# Patient Record
Sex: MALE | Race: WHITE | Employment: OTHER | URBAN - METROPOLITAN AREA
[De-identification: names, ages, dates, MRNs, and addresses within clinical notes are randomized per-mention and may not be internally consistent; named-entity substitution may affect disease eponyms.]

---

## 2017-02-17 ENCOUNTER — DOCUMENTATION ONLY (OUTPATIENT)
Dept: VASCULAR SURGERY | Facility: CLINIC | Age: 67
End: 2017-02-17

## 2017-03-21 ENCOUNTER — TRANSFERRED RECORDS (OUTPATIENT)
Dept: HEALTH INFORMATION MANAGEMENT | Facility: CLINIC | Age: 67
End: 2017-03-21

## 2017-05-15 ENCOUNTER — OFFICE VISIT (OUTPATIENT)
Dept: FAMILY MEDICINE | Facility: CLINIC | Age: 67
End: 2017-05-15

## 2017-05-15 VITALS
SYSTOLIC BLOOD PRESSURE: 92 MMHG | HEART RATE: 62 BPM | OXYGEN SATURATION: 100 % | BODY MASS INDEX: 22.25 KG/M2 | DIASTOLIC BLOOD PRESSURE: 62 MMHG | TEMPERATURE: 97.7 F | WEIGHT: 150.75 LBS

## 2017-05-15 DIAGNOSIS — I50.32 CHRONIC DIASTOLIC CONGESTIVE HEART FAILURE (H): Primary | ICD-10-CM

## 2017-05-15 DIAGNOSIS — R52 INTRACTABLE PAIN: ICD-10-CM

## 2017-05-15 DIAGNOSIS — F10.10 EXCESSIVE DRINKING OF ALCOHOL: ICD-10-CM

## 2017-05-15 DIAGNOSIS — L40.9 PSORIASIS: ICD-10-CM

## 2017-05-15 RX ORDER — CLOBETASOL PROPIONATE 0.05 G/100ML
SHAMPOO TOPICAL
COMMUNITY

## 2017-05-15 RX ORDER — CLOBETASOL PROPIONATE 0.5 MG/G
CREAM TOPICAL 2 TIMES DAILY
COMMUNITY

## 2017-05-15 NOTE — MR AVS SNAPSHOT
After Visit Summary   5/15/2017    Montana Salamanca    MRN: [id number]           Patient Information     Date Of Birth          1950        Visit Information        Provider Department      5/15/2017 10:00 AM Tomas Pena MD [facility]         Follow-ups after your visit        Who to contact     Please call your clinic at [phone] to:    Ask questions about your health    Make or cancel appointments    Discuss your medicines    Learn about your test results    Speak to your doctor   If you have compliments or concerns about an experience at your clinic, or if you wish to file a complaint, please contact [facility] Patient Relations at [phone] or email us at [email or web address]         Additional Information About Your Visit        MyChart Information     Smart [employer] gives you secure access to your electronic health record. If you see a primary care provider, you can also send messages to your care team and make appointments. If you have questions, please call your primary care clinic.  If you do not have a primary care provider, please call [phone] and they will assist you.      B-Obvious is an electronic gateway that provides easy, online access to your medical records. With B-Obvious, you can request a clinic appointment, read your test results, renew a prescription or communicate with your care team.     To access your existing account, please contact your [facility] or call [phone] for assistance.        Care EveryWhere ID     This is your Care EveryWhere ID. This could be used by other organizations to access your [facility] medical records  [id number]        Your Vitals Were     Pulse Temperature Pulse Oximetry BMI (Body Mass Index)          62 97.7  F (36.5  C) (Oral) 100% 22.25 kg/m2         Blood Pressure from Last 3 Encounters:   05/15/17 92/62   08/31/16 134/83   06/29/16 128/69    Weight from  Last 3 Encounters:   05/15/17 150 lb 12 oz (68.4 kg)   08/31/16 148 lb (67.1 kg)   06/29/16 145 lb 14.4 oz (66.2 kg)              Today, you had the following     No orders found for display         Today's Medication Changes          These changes are accurate as of: 5/15/17 11:16 AM.  If you have any questions, ask your nurse or doctor.               These medicines have changed or have updated prescriptions.        Dose/Directions    polyethylene glycol 3350 Powd   This may have changed:    - when to take this  - reasons to take this  - additional instructions   Used for:  Constipation, unspecified constipation type        Take 17 gm daily by mouth, dissolved in 8 oz fluid.   Quantity:  3 Bottle   Refills:  1                Primary Care Provider Office Phone # Fax #    Tomas Pena [phone]       [address] [address]        Thank you!     Thank you for choosing [facility]  for your care. Our goal is always to provide you with excellent care. Hearing back from our patients is one way we can continue to improve our services. Please take a few minutes to complete the written survey that you may receive in the mail after your visit with us. Thank you!             Your Updated Medication List - Protect others around you: Learn how to safely use, store and throw away your medicines at www.disposemymeds.org.          This list is accurate as of: 5/15/17 11:16 AM.  Always use your most recent med list.                   Brand Name Dispense Instructions for use    acyclovir 800 MG tablet    ZOVIRAX    50 tablet    Take 1 tablet by mouth 5 times daily. (every 4 hours) For the next 7 - 10 days.       aspirin 81 MG tablet      Take  by mouth daily.       BENADRYL 25 MG tablet   Generic drug:  diphenhydrAMINE      Take 25 mg by mouth every 6 hours as needed. When stung or have allergic reaction to food, use with Epipen.       bisacodyl 5 MG EC tablet     DULCOLAX     Take 1 tablet by mouth daily as needed       carvedilol 6.25 MG tablet    COREG    180 tablet    Take 1 tablet (6.25 mg) by mouth 2 times daily (with meals)       chlorpheniramine-pseudoePHEDrine 8-120 MG per capsule    DECONAMINE SR     Take 1 capsule by mouth 2 times daily as needed.       * clobetasol 0.05 % cream    TEMOVATE     Apply topically 2 times daily       * clobetasol propionate 0.05 % Sham          clotrimazole-betamethasone lotion    LOTRISONE     Apply  topically as needed.       desonide 0.05 % cream    DESOWEN     Apply  topically as needed.       diazepam 2 MG tablet    VALIUM    30 tablet    Take 1 tablet (2 mg) by mouth nightly as needed for anxiety or sleep       DULoxetine 60 MG EC capsule    CYMBALTA    90 capsule    Take 1 capsule (60 mg) by mouth daily       EPINEPHrine 0.3 MG/0.3ML injection    EPIPEN 2-HUGO    2 each    Inject 0.3 mLs (0.3 mg) into the muscle once as needed for anaphylaxis       ketoconazole 2 % cream    NIZORAL     Apply  topically daily as needed.       losartan 50 MG tablet    COZAAR    180 tablet    Take 1 tablet (50 mg) by mouth 2 times daily       magnesium 250 MG tablet      Take 1 tablet by mouth as needed       OTEZLA 30 MG tablet   Generic drug:  apremilast      Take 30 mg by mouth daily Reported on 5/15/2017       pantoprazole 40 MG EC tablet    PROTONIX    180 tablet    Take 1 tab po twice daily       polyethylene glycol 3350 Powd     3 Bottle    Take 17 gm daily by mouth, dissolved in 8 oz fluid.       SIMETHICONE      Take 1-2 a week.       simvastatin 20 MG tablet    ZOCOR    90 tablet    Take 1 tablet (20 mg) by mouth At Bedtime       spironolactone 50 MG tablet    ALDACTONE    90 tablet    Take 1 tablet (50 mg) by mouth daily       VITAMIN B COMPLEX PO      Take 1 tablet by mouth daily.       vitamin D 1000 UNITS capsule      Take 1 capsule by mouth daily.       * Notice:  This list has 2 medication(s) that are the same as other medications  prescribed for you. Read the directions carefully, and ask your doctor or other care provider to review them with you.

## 2017-05-15 NOTE — NURSING NOTE
67 year old  Chief Complaint   Patient presents with     RECHECK     labs       Blood pressure 92/62, pulse 62, temperature 97.7  F (36.5  C), temperature source Oral, weight 150 lb 12 oz (68.4 kg), SpO2 100 %. Body mass index is 22.25 kg/(m^2).  Patient Active Problem List   Diagnosis     Hypotestosteronism     Vitamin D deficiency     Hyperlipidemia LDL goal <130     Osteopenia     Preventative health care     Anxiety     Cervical pain (neck)     PTSD (post-traumatic stress disorder)     CHF (congestive heart failure) (H)     Bleeding external hemorrhoids     Excessive drinking of alcohol     Chronic pain of left knee       Wt Readings from Last 2 Encounters:   05/15/17 150 lb 12 oz (68.4 kg)   08/31/16 148 lb (67.1 kg)     BP Readings from Last 3 Encounters:   05/15/17 92/62   08/31/16 134/83   06/29/16 128/69         Current Outpatient Prescriptions   Medication     clobetasol (TEMOVATE) 0.05 % cream     clobetasol propionate 0.05 % SHAM     simvastatin (ZOCOR) 20 MG tablet     spironolactone (ALDACTONE) 50 MG tablet     carvedilol (COREG) 6.25 MG tablet     DULoxetine (CYMBALTA) 60 MG capsule     pantoprazole (PROTONIX) 40 MG enteric coated tablet     losartan (COZAAR) 50 MG tablet     apremilast (OTEZLA) 30 MG tablet     polyethylene glycol 3350 POWD     diazepam (VALIUM) 2 MG tablet     EPINEPHrine (EPIPEN 2-HUGO) 0.3 MG/0.3ML injection     aspirin 81 MG tablet     magnesium 250 MG tablet     SIMETHICONE     B Complex Vitamins (VITAMIN B COMPLEX PO)     Cholecalciferol (VITAMIN D) 1000 UNITS capsule     diphenhydrAMINE (BENADRYL) 25 MG tablet     bisacodyl (DULCOLAX) 5 MG EC tablet     chlorpheniramine-pseudoePHEDrine (DECONAMINE SR) 8-120 MG per capsule     clotrimazole-betamethasone (LOTRISONE) lotion     desonide (DESOWEN) 0.05 % cream     ketoconazole (NIZORAL) 2 % cream     acyclovir (ZOVIRAX) 800 MG tablet     No current facility-administered medications for this visit.        Social History   Substance  Use Topics     Smoking status: Never Smoker     Smokeless tobacco: Never Used     Alcohol use No       Health Maintenance Due   Topic Date Due     ADVANCE DIRECTIVE PLANNING Q5 YRS (NO INBASKET)  03/31/1969     FALL RISK ASSESSMENT  03/31/2016     DEXA Q3 YR  07/12/2016     PNEUMOCOCCAL (2 of 2 - PPSV23) 08/04/2016       No results found for: PAP      May 15, 2017 10:04 AM

## 2017-05-16 PROBLEM — L40.9 PSORIASIS: Status: ACTIVE | Noted: 2017-05-16

## 2017-05-16 PROBLEM — R52 INTRACTABLE PAIN: Status: ACTIVE | Noted: 2017-05-16

## 2017-05-16 NOTE — PROGRESS NOTES
"CHIEF COMPLAINT:  Followup regarding a number of chronic conditions.      HISTORY OF PRESENT ILLNESS:  Montana is a 67-year-old who spends much of his year living in [place].  He is back for a few weeks.      He has a history of CHF and for that is on a combination of spironolactone 50 mg daily, carvedilol 6.25 mg daily and losartan 50 mg twice daily.  He has basically stopped drinking all alcohol, and as a result, his blood pressure has gone from 134/83 last summer down to 92/62.  In my opinion, he is somewhat overmedicated at this point.  We discussed the 3 different categories of antihypertensive medication that he is on and the fact that he could certainly decrease the losartan from 50 mg b.i.d. to once daily.  He is going to go ahead and do that.      As mentioned, he is really diminished his alcohol intake.  However, he is now smoking a cigar up to 3 times daily.  He was hoping that I could endorse this.  Of course, I cannot.  I am not really sure this is the lesser of 2 evils, but his blood pressure is certainly better as a result.  He has not lost any weight.  In addition, he has intractable pain in his \"whole spine.\"  He has found that using medicinal marijuana in [place] has been extremely beneficial.  It allows him to sleep and get a good night of sleep.  Therefore, while in [place], he would like to be registered for the [place] Program.  The nature of this was discussed.  He understands he needs to sign up, indicate on a pain scale where he is at and that the medication is not covered by insurance.  In addition, it is not smokable.  He understands all of this.      CURRENT MEDICATIONS:  Reviewed.      OBJECTIVE:  Montana is in no distress.  BP 92/62 with a heart rate of 62 and regular.  Temperature is 97.7.  He weighs 150 pounds.  BMI 22.25.  O2 sats 100% on room air.  He brought some labs from [place].  His lipid panel looks really quite good.  Total 174 with an HDL of 79 and a ratio of just 2.2.  LDL " "cholesterol all the way down to 42.  Glucose 104.  Creatinine 1.29, 4/100 of a point elevated.  BUN elevated by 1 point to 26.  GFR diminished by 2 points at 58.  He has always been slightly anemic and his hemoglobin is 11.1.  All of these labs were obtained on March 21 in [place].  His PHQ-9 came back with a total score of 11.  Symptoms make it \"somewhat difficult\" to get through the day.  He is on duloxetine 60 mg daily and would like to continue taking that.  He has a history of PTSD.  Physical exam not performed today.  Labs reviewed from [place].  He also reports his cardiologist there told him that his most recent ejection fraction was somewhere between 45% and 50%, and in the hospital, it had been 50% to 55% but they thought that those were within the margin of error.      ASSESSMENT AND PLAN:   1.  History of congestive heart failure with elevated blood pressure.  On 3 antihypertensive/cardiovascular medications.  We are going to decrease his losartan from 50 mg twice daily to once daily.   2.  Intractable pain.  This is in his spine from neck to the base.  I will involve him in the [place] [facility] Cannabis Program.  It is up to him to follow through on that.  His PEG score was 9/10.   3.  History of excessive drinking of alcohol, now markedly curtailed.  He switched to smoking cigars.   4.  Not mentioned above but he has very severe psoriasis.  He has been on 1 biologic agent which has been stopped.  Humira was considered, but he is not going to do that.  He is open to seeing one of our dermatologists here.  I will place the referral for that.   5.  PTSD, stable.  Call with any problems or questions.      Total time spent with Mr. Salamanca, over 25 minutes, more than 50% of that time was spent in care, coordination and counseling.       "

## 2017-05-17 ASSESSMENT — ANXIETY QUESTIONNAIRES
1. FEELING NERVOUS, ANXIOUS, OR ON EDGE: NEARLY EVERY DAY
6. BECOMING EASILY ANNOYED OR IRRITABLE: SEVERAL DAYS
2. NOT BEING ABLE TO STOP OR CONTROL WORRYING: MORE THAN HALF THE DAYS
IF YOU CHECKED OFF ANY PROBLEMS ON THIS QUESTIONNAIRE, HOW DIFFICULT HAVE THESE PROBLEMS MADE IT FOR YOU TO DO YOUR WORK, TAKE CARE OF THINGS AT HOME, OR GET ALONG WITH OTHER PEOPLE: SOMEWHAT DIFFICULT
7. FEELING AFRAID AS IF SOMETHING AWFUL MIGHT HAPPEN: MORE THAN HALF THE DAYS
5. BEING SO RESTLESS THAT IT IS HARD TO SIT STILL: NOT AT ALL
GAD7 TOTAL SCORE: 11
3. WORRYING TOO MUCH ABOUT DIFFERENT THINGS: MORE THAN HALF THE DAYS

## 2017-05-17 ASSESSMENT — PATIENT HEALTH QUESTIONNAIRE - PHQ9: 5. POOR APPETITE OR OVEREATING: SEVERAL DAYS

## 2017-05-18 ASSESSMENT — ANXIETY QUESTIONNAIRES: GAD7 TOTAL SCORE: 11

## 2017-05-18 ASSESSMENT — PATIENT HEALTH QUESTIONNAIRE - PHQ9: SUM OF ALL RESPONSES TO PHQ QUESTIONS 1-9: 12

## 2017-06-21 DIAGNOSIS — F33.1 MAJOR DEPRESSIVE DISORDER, RECURRENT EPISODE, MODERATE (H): ICD-10-CM

## 2017-06-21 DIAGNOSIS — E78.5 HYPERLIPIDEMIA LDL GOAL <130: ICD-10-CM

## 2017-06-21 RX ORDER — SIMVASTATIN 20 MG
20 TABLET ORAL AT BEDTIME
Qty: 90 TABLET | Refills: 0 | Status: SHIPPED | OUTPATIENT
Start: 2017-06-21 | End: 2017-12-22

## 2017-06-21 RX ORDER — DULOXETIN HYDROCHLORIDE 60 MG/1
60 CAPSULE, DELAYED RELEASE ORAL DAILY
Qty: 90 CAPSULE | Refills: 1 | Status: SHIPPED | OUTPATIENT
Start: 2017-06-21 | End: 2018-05-15

## 2017-06-21 NOTE — TELEPHONE ENCOUNTER
Routing refill request to provider for review/approval because: Zocor  Drug interaction warning- Ketoconazole and Zocor      Refilled Cymbalta for 6 months.       Cymbalta     Last Office Visit with [facility] primary care provider:  5/2017        Last PHQ-9 score on record=   PHQ-9 SCORE 5/17/2017   Total Score 12         Zocor       Last Office Visit with [facility], [facility] or [facility] prescribing provider: 5/2017       Lab Results   Component Value Date    CHOL 245 08/31/2016     Lab Results   Component Value Date     08/31/2016     Lab Results   Component Value Date    LDL 52 08/31/2016     Lab Results   Component Value Date    TRIG 59 08/31/2016     Lab Results   Component Value Date    CHOLHDLRATIO 1.4 08/04/2015

## 2017-06-23 DIAGNOSIS — I10 ESSENTIAL HYPERTENSION WITH GOAL BLOOD PRESSURE LESS THAN 140/90: ICD-10-CM

## 2017-06-23 RX ORDER — CARVEDILOL 6.25 MG/1
6.25 TABLET ORAL 2 TIMES DAILY WITH MEALS
Qty: 180 TABLET | Refills: 1 | Status: SHIPPED | OUTPATIENT
Start: 2017-08-01 | End: 2018-01-30

## 2017-06-23 RX ORDER — LOSARTAN POTASSIUM 50 MG/1
50 TABLET ORAL DAILY
Qty: 180 TABLET | Refills: 3 | COMMUNITY
Start: 2017-05-15 | End: 2018-05-15

## 2017-06-23 RX ORDER — SPIRONOLACTONE 50 MG/1
50 TABLET, FILM COATED ORAL DAILY
Qty: 90 TABLET | Refills: 1 | Status: SHIPPED | OUTPATIENT
Start: 2017-08-01 | End: 2018-01-30

## 2017-06-23 NOTE — TELEPHONE ENCOUNTER
Prescription approved per [facility] Refill Protocol.  6 month supply only, future fill to mail order for when will be due.    Labs scanned in for March date on the BMP results   Changed the Losartan to historical,  only one daily now per Becky instruction at last visit.

## 2017-11-06 DIAGNOSIS — K21.9 GASTROESOPHAGEAL REFLUX DISEASE, ESOPHAGITIS PRESENCE NOT SPECIFIED: ICD-10-CM

## 2017-11-06 RX ORDER — PANTOPRAZOLE SODIUM 40 MG/1
TABLET, DELAYED RELEASE ORAL
Qty: 180 TABLET | Refills: 1 | Status: SHIPPED | OUTPATIENT
Start: 2017-11-06 | End: 2018-05-15

## 2017-12-22 DIAGNOSIS — E78.5 HYPERLIPIDEMIA LDL GOAL <130: ICD-10-CM

## 2017-12-22 RX ORDER — SIMVASTATIN 20 MG
20 TABLET ORAL AT BEDTIME
Qty: 90 TABLET | Refills: 1 | Status: SHIPPED | OUTPATIENT
Start: 2017-12-22 | End: 2018-10-12

## 2018-01-30 DIAGNOSIS — I10 ESSENTIAL HYPERTENSION WITH GOAL BLOOD PRESSURE LESS THAN 140/90: ICD-10-CM

## 2018-01-30 NOTE — TELEPHONE ENCOUNTER
Last office visit: 05/15/2017, no future appoint,    Medication is being filled for 1 time refill only due to:  Patient needs labs BMP. Future labs ordered BMP.   Routed to provider for mail order

## 2018-01-31 RX ORDER — SPIRONOLACTONE 50 MG/1
50 TABLET, FILM COATED ORAL DAILY
Qty: 90 TABLET | Refills: 1 | Status: SHIPPED | OUTPATIENT
Start: 2018-01-31 | End: 2018-12-26

## 2018-01-31 RX ORDER — CARVEDILOL 6.25 MG/1
6.25 TABLET ORAL 2 TIMES DAILY WITH MEALS
Qty: 180 TABLET | Refills: 1 | Status: SHIPPED | OUTPATIENT
Start: 2018-01-31 | End: 2018-07-16

## 2018-05-07 ENCOUNTER — OFFICE VISIT (OUTPATIENT)
Dept: FAMILY MEDICINE | Facility: CLINIC | Age: 68
End: 2018-05-07
Payer: MEDICARE

## 2018-05-07 VITALS
OXYGEN SATURATION: 100 % | SYSTOLIC BLOOD PRESSURE: 136 MMHG | HEART RATE: 61 BPM | WEIGHT: 150.75 LBS | DIASTOLIC BLOOD PRESSURE: 77 MMHG | HEIGHT: 69 IN | TEMPERATURE: 97.6 F | BODY MASS INDEX: 22.33 KG/M2

## 2018-05-07 DIAGNOSIS — N62 GYNECOMASTIA: Primary | ICD-10-CM

## 2018-05-07 DIAGNOSIS — R79.89 ELEVATED LFTS: ICD-10-CM

## 2018-05-07 DIAGNOSIS — E34.9 HYPOTESTOSTERONISM: ICD-10-CM

## 2018-05-07 DIAGNOSIS — N53.14 EJACULATION, RETROGRADE: ICD-10-CM

## 2018-05-07 LAB
ALBUMIN SERPL-MCNC: 4.5 G/DL (ref 3.4–5)
ALP SERPL-CCNC: 46 U/L (ref 40–150)
ALT SERPL W P-5'-P-CCNC: 28 U/L (ref 0–70)
AST SERPL W P-5'-P-CCNC: 30 U/L (ref 0–45)
BILIRUB DIRECT SERPL-MCNC: 0.1 MG/DL (ref 0–0.2)
BILIRUB SERPL-MCNC: 0.5 MG/DL (ref 0.2–1.3)
ESTRADIOL SERPL-MCNC: 27 PG/ML (ref 6–50)
FSH SERPL-ACNC: 70 IU/L (ref 0.7–10.8)
GGT SERPL-CCNC: 39 U/L (ref 0–75)
LH SERPL-ACNC: 45.7 IU/L (ref 1.5–9.3)
PROLACTIN SERPL-MCNC: 6 UG/L (ref 2–18)
PROT SERPL-MCNC: 8.4 G/DL (ref 6.8–8.8)
PSA SERPL-ACNC: 0.07 UG/L (ref 0–4)

## 2018-05-07 ASSESSMENT — PAIN SCALES - GENERAL: PAINLEVEL: NO PAIN (0)

## 2018-05-07 NOTE — MR AVS SNAPSHOT
"              After Visit Summary   5/7/2018    Montana Salamanca    MRN: [id number]           Patient Information     Date Of Birth          1950        Visit Information        Provider Department      5/7/2018 11:00 AM Tomas Pena MD [facility]        Today's Diagnoses     Gynecomastia    -  1    Elevated LFTs        Ejaculation, retrograde        Hypotestosteronism           Follow-ups after your visit        Who to contact     Please call your clinic at [phone] to:    Ask questions about your health    Make or cancel appointments    Discuss your medicines    Learn about your test results    Speak to your doctor            Additional Information About Your Visit        People Patternhart Information     [employer] Network gives you secure access to your electronic health record. If you see a primary care provider, you can also send messages to your care team and make appointments. If you have questions, please call your primary care clinic.  If you do not have a primary care provider, please call [phone] and they will assist you.      Rx Network is an electronic gateway that provides easy, online access to your medical records. With [employer], you can request a clinic appointment, read your test results, renew a prescription or communicate with your care team.     To access your existing account, please contact your [facility] or call [phone] for assistance.        Care EveryWhere ID     This is your Care EveryWhere ID. This could be used by other organizations to access your [facility] medical records  [id number]        Your Vitals Were     Pulse Temperature Height Pulse Oximetry BMI (Body Mass Index)       61 97.6  F (36.4  C) (Temporal) 5' 9.02\" (175.3 cm) 100% 22.25 kg/m2        Blood Pressure from Last 3 Encounters:   05/07/18 136/77   05/15/17 92/62   08/31/16 134/83    Weight from Last 3 Encounters:   05/07/18 150 lb 12 oz (68.4 kg)   05/15/17 150 lb 12 oz (68.4 kg) "   08/31/16 148 lb (67.1 kg)              We Performed the Following     Estradiol     Follicle stimulating hormone     GGT     Hepatic panel     Lutropin     Prolactin     Prostate spec antigen screen     Testosterone Free and Total          Today's Medication Changes          These changes are accurate as of 5/7/18  1:57 PM.  If you have any questions, ask your nurse or doctor.               These medicines have changed or have updated prescriptions.        Dose/Directions    polyethylene glycol 3350 Powd   This may have changed:    - when to take this  - reasons to take this  - additional instructions   Used for:  Constipation, unspecified constipation type        Take 17 gm daily by mouth, dissolved in 8 oz fluid.   Quantity:  3 Bottle   Refills:  1                Primary Care Provider Office Phone # Fax #    Tomas Pena [phone]       [address] [place] [address]        Equal Access to Services     MARIKA LOCKHART : Gilbert Reddy, wablaine mckenzieqgarry, qaybta kaalmazion dorman, simone crump . So [facility] [phone].    ATENCIÓN: Si habla español, tiene a cerna disposición servicios gratuitos de asistencia lingüística. Llame al [phone].    We comply with applicable federal civil rights laws and [place] laws. We do not discriminate on the basis of race, color, national origin, age, disability, sex, sexual orientation, or gender identity.            Thank you!     Thank you for choosing [facility]  for your care. Our goal is always to provide you with excellent care. Hearing back from our patients is one way we can continue to improve our services. Please take a few minutes to complete the written survey that you may receive in the mail after your visit with us. Thank you!             Your Updated Medication List - Protect others around you: Learn how to safely use, store and throw away your medicines at www.disposemymeds.org.           This list is accurate as of 5/7/18  1:57 PM.  Always use your most recent med list.                   Brand Name Dispense Instructions for use Diagnosis    acyclovir 800 MG tablet    ZOVIRAX    50 tablet    Take 1 tablet by mouth 5 times daily. (every 4 hours) For the next 7 - 10 days.    Herpes zoster       aspirin 81 MG tablet      Take  by mouth daily.        BENADRYL 25 MG tablet   Generic drug:  diphenhydrAMINE      Take 25 mg by mouth every 6 hours as needed. When stung or have allergic reaction to food, use with Epipen.        bisacodyl 5 MG EC tablet    DULCOLAX     Take 1 tablet by mouth daily as needed        carvedilol 6.25 MG tablet    COREG    180 tablet    Take 1 tablet (6.25 mg) by mouth 2 times daily (with meals)    Essential hypertension with goal blood pressure less than 140/90       chlorpheniramine-pseudoePHEDrine 8-120 MG per capsule    DECONAMINE SR     Take 1 capsule by mouth 2 times daily as needed.        * clobetasol 0.05 % cream    TEMOVATE     Apply topically 2 times daily        * clobetasol propionate 0.05 % Sham           clotrimazole-betamethasone lotion    LOTRISONE     Apply  topically as needed.        desonide 0.05 % cream    DESOWEN     Apply  topically as needed.        diazepam 2 MG tablet    VALIUM    30 tablet    Take 1 tablet (2 mg) by mouth nightly as needed for anxiety or sleep    Cervical pain (neck)       DULoxetine 60 MG EC capsule    CYMBALTA    90 capsule    Take 1 capsule (60 mg) by mouth daily    Major depressive disorder, recurrent episode, moderate (H)       EPINEPHrine 0.3 MG/0.3ML injection 2-pack    EPIPEN 2-HUGO    2 each    Inject 0.3 mLs (0.3 mg) into the muscle once as needed for anaphylaxis    Anaphylaxis due to fruits       ketoconazole 2 % cream    NIZORAL     Apply  topically daily as needed.        losartan 50 MG tablet    COZAAR    180 tablet    Take 1 tablet (50 mg) by mouth daily    Essential hypertension with goal blood pressure less than  140/90       magnesium 250 MG tablet      Take 1 tablet by mouth as needed        OTEZLA 30 MG tablet   Generic drug:  apremilast      Take 30 mg by mouth daily Reported on 5/15/2017        pantoprazole 40 MG EC tablet    PROTONIX    180 tablet    Take 1 tab po twice daily    Gastroesophageal reflux disease, esophagitis presence not specified       polyethylene glycol 3350 Powd     3 Bottle    Take 17 gm daily by mouth, dissolved in 8 oz fluid.    Constipation, unspecified constipation type       SIMETHICONE      Take 1-2 a week.        simvastatin 20 MG tablet    ZOCOR    90 tablet    Take 1 tablet (20 mg) by mouth At Bedtime    Hyperlipidemia LDL goal <130       spironolactone 50 MG tablet    ALDACTONE    90 tablet    Take 1 tablet (50 mg) by mouth daily    Essential hypertension with goal blood pressure less than 140/90       VITAMIN B COMPLEX PO      Take 1 tablet by mouth daily.        vitamin D 1000 units capsule      Take 1 capsule by mouth daily.        * Notice:  This list has 2 medication(s) that are the same as other medications prescribed for you. Read the directions carefully, and ask your doctor or other care provider to review them with you.

## 2018-05-07 NOTE — NURSING NOTE
"68 year old  Chief Complaint   Patient presents with     RECHECK     Check prostate. Pt says he has also developed breasts slowly over the last year, and also says he has issues with ejaculation.       Blood pressure 136/77, pulse 61, temperature 97.6  F (36.4  C), temperature source Temporal, height 5' 9.02\" (175.3 cm), weight 150 lb 12 oz (68.4 kg), SpO2 100 %. Body mass index is 22.25 kg/(m^2).  Patient Active Problem List   Diagnosis     Hypotestosteronism     Vitamin D deficiency     Hyperlipidemia LDL goal <130     Osteopenia     Preventative health care     Anxiety     Cervical pain (neck)     PTSD (post-traumatic stress disorder)     CHF (congestive heart failure) (H)     Bleeding external hemorrhoids     Excessive drinking of alcohol     Chronic pain of left knee     Intractable pain     Psoriasis       Wt Readings from Last 2 Encounters:   05/07/18 150 lb 12 oz (68.4 kg)   05/15/17 150 lb 12 oz (68.4 kg)     BP Readings from Last 3 Encounters:   05/07/18 136/77   05/15/17 92/62   08/31/16 134/83         Current Outpatient Prescriptions   Medication     acyclovir (ZOVIRAX) 800 MG tablet     apremilast (OTEZLA) 30 MG tablet     aspirin 81 MG tablet     B Complex Vitamins (VITAMIN B COMPLEX PO)     bisacodyl (DULCOLAX) 5 MG EC tablet     carvedilol (COREG) 6.25 MG tablet     chlorpheniramine-pseudoePHEDrine (DECONAMINE SR) 8-120 MG per capsule     Cholecalciferol (VITAMIN D) 1000 UNITS capsule     clobetasol (TEMOVATE) 0.05 % cream     clobetasol propionate 0.05 % SHAM     clotrimazole-betamethasone (LOTRISONE) lotion     desonide (DESOWEN) 0.05 % cream     diazepam (VALIUM) 2 MG tablet     diphenhydrAMINE (BENADRYL) 25 MG tablet     DULoxetine (CYMBALTA) 60 MG EC capsule     EPINEPHrine (EPIPEN 2-HUGO) 0.3 MG/0.3ML injection     ketoconazole (NIZORAL) 2 % cream     losartan (COZAAR) 50 MG tablet     magnesium 250 MG tablet     pantoprazole (PROTONIX) 40 MG EC tablet     polyethylene glycol 3350 POWD     " SIMETHICONE     simvastatin (ZOCOR) 20 MG tablet     spironolactone (ALDACTONE) 50 MG tablet     No current facility-administered medications for this visit.        Social History   Substance Use Topics     Smoking status: Never Smoker     Smokeless tobacco: Never Used     Alcohol use No       Health Maintenance Due   Topic Date Due     ADVANCE DIRECTIVE PLANNING Q5 YRS  03/31/2006     FALL RISK ASSESSMENT  03/31/2016     DEXA Q3 YR  07/12/2016     PNEUMOCOCCAL (2 of 2 - PPSV23) 08/04/2016     PHQ-9 Q1YR  05/15/2018       No results found for: MARIE Bhat MA  May 7, 2018 11:19 AM

## 2018-05-10 LAB
SHBG SERPL-SCNC: 35 NMOL/L (ref 11–80)
TESTOST FREE SERPL-MCNC: 1.91 NG/DL (ref 4.7–24.4)
TESTOST SERPL-MCNC: 108 NG/DL (ref 240–950)

## 2018-05-15 DIAGNOSIS — K21.9 GASTROESOPHAGEAL REFLUX DISEASE, ESOPHAGITIS PRESENCE NOT SPECIFIED: ICD-10-CM

## 2018-05-15 DIAGNOSIS — F33.1 MAJOR DEPRESSIVE DISORDER, RECURRENT EPISODE, MODERATE (H): ICD-10-CM

## 2018-05-15 DIAGNOSIS — I10 ESSENTIAL HYPERTENSION WITH GOAL BLOOD PRESSURE LESS THAN 140/90: ICD-10-CM

## 2018-05-15 RX ORDER — DULOXETIN HYDROCHLORIDE 60 MG/1
60 CAPSULE, DELAYED RELEASE ORAL DAILY
Qty: 90 CAPSULE | Refills: 2 | Status: SHIPPED | OUTPATIENT
Start: 2018-05-15 | End: 2019-03-21

## 2018-05-15 RX ORDER — PANTOPRAZOLE SODIUM 40 MG/1
TABLET, DELAYED RELEASE ORAL
Qty: 180 TABLET | Refills: 3 | Status: SHIPPED | OUTPATIENT
Start: 2018-05-15 | End: 2019-07-12

## 2018-05-15 RX ORDER — LOSARTAN POTASSIUM 50 MG/1
50 TABLET ORAL DAILY
Qty: 180 TABLET | Refills: 0 | Status: SHIPPED | OUTPATIENT
Start: 2018-05-15 | End: 2018-07-19

## 2018-05-15 NOTE — TELEPHONE ENCOUNTER
Last office visit: 05/07/2018, no future appointment.    Medication is being filled for 1 time refill only due to:  Patient needs labs BMP-already in chart. Future labs ordered BMP.

## 2018-06-03 NOTE — PROGRESS NOTES
"CHIEF COMPLAINT:  Prostate concerns, as well as breast concerns.      HISTORY OF PRESENT ILLNESS:  [place] is a 68-year-old who is home for a few months from [place].  He has noticed that he has developed breasts over the past year.  He certainly is having some retrograde ejaculation issues.  Therefore, he would like to get his PSA level checked.      He has a history of hypotestosteronism and has been on testosterone for some time.  He is not currently on it.  He does not feel that his testicle size has changed.  His strength is not good and he is on disability.  He fell one time this last year.  He is using medicinal marijuana in [place] and he finds that this helps tremendously with his sleep.      ACTIVE MEDICAL PROBLEMS:  Reviewed.      CURRENT MEDICATIONS:  Reviewed.      REVIEW OF SYSTEMS:  He has not had any nipple discharge.  Both breasts feel to be about the same size.  He has not seen any blood in the urine.  No blood in the semen.      OBJECTIVE:  [place] is in no distress.  Good eye contact.  He is alert and oriented x3.  Speech is articulate.  /77 with a heart rate of 61 and regular.  Temperature is 97.6.  He is 5 feet 9 and weighs 150 pounds, 12 ounces, with a BMI of 22.25.  Weight is unchanged over the last year.  O2 sats are 100% on room air.  PHQ-9 score came back at 10 and symptoms make it \"very difficult\" to get through the day.  VERONIKA-7 score came back at 9 and symptoms there make it \"somewhat difficult\" to get through the day.      LABORATORY DATA:  Labs today will consist of a PSA 50, testosterone (free and total), prolactin, luteinizing hormone, hepatic panel, GGT, FSH and estradiol.  We can certainly discuss mammography, but my exam today reveals that the minimal gynecomastia he has is very symmetric.  There is absolutely no mass felt on either side.  A review of UpToDate reveals that imaging likely is not going to be very helpful in the context of symmetric gynecomastia.  Use of testosterone " can certainly create this.      ASSESSMENT AND PLAN:   1.  Gynecomastia.  Labs pending in the context of hypotestosteronism.  He will be notified of those results.   2.  History of elevated liver function tests, largely due to high alcohol consumption.  He has really cut back.  We will see what the numbers look like and I have also checked a GGT level today.   3.  Retrograde ejaculation (or that is what Mr. Salamanca feels is going on).  PSA 50 pending.   4.  While he is in [place], I would be happy to refer him to Urology if he would like to go.  We will get all the results back first and decide next steps.  If he wants to go back on testosterone, we will check some levels before he returns to [place] for the rest of the winter.

## 2018-06-04 ASSESSMENT — ANXIETY QUESTIONNAIRES
GAD7 TOTAL SCORE: 9
3. WORRYING TOO MUCH ABOUT DIFFERENT THINGS: SEVERAL DAYS
2. NOT BEING ABLE TO STOP OR CONTROL WORRYING: MORE THAN HALF THE DAYS
6. BECOMING EASILY ANNOYED OR IRRITABLE: SEVERAL DAYS
7. FEELING AFRAID AS IF SOMETHING AWFUL MIGHT HAPPEN: SEVERAL DAYS
1. FEELING NERVOUS, ANXIOUS, OR ON EDGE: MORE THAN HALF THE DAYS
5. BEING SO RESTLESS THAT IT IS HARD TO SIT STILL: NOT AT ALL
IF YOU CHECKED OFF ANY PROBLEMS ON THIS QUESTIONNAIRE, HOW DIFFICULT HAVE THESE PROBLEMS MADE IT FOR YOU TO DO YOUR WORK, TAKE CARE OF THINGS AT HOME, OR GET ALONG WITH OTHER PEOPLE: SOMEWHAT DIFFICULT

## 2018-06-04 ASSESSMENT — PATIENT HEALTH QUESTIONNAIRE - PHQ9: 5. POOR APPETITE OR OVEREATING: MORE THAN HALF THE DAYS

## 2018-06-05 ASSESSMENT — ANXIETY QUESTIONNAIRES: GAD7 TOTAL SCORE: 9

## 2018-06-05 ASSESSMENT — PATIENT HEALTH QUESTIONNAIRE - PHQ9: SUM OF ALL RESPONSES TO PHQ QUESTIONS 1-9: 10

## 2018-07-16 DIAGNOSIS — I10 ESSENTIAL HYPERTENSION WITH GOAL BLOOD PRESSURE LESS THAN 140/90: ICD-10-CM

## 2018-07-16 RX ORDER — CARVEDILOL 6.25 MG/1
6.25 TABLET ORAL 2 TIMES DAILY WITH MEALS
Qty: 180 TABLET | Refills: 1 | Status: SHIPPED | OUTPATIENT
Start: 2018-07-16 | End: 2019-09-20

## 2018-07-16 NOTE — TELEPHONE ENCOUNTER
Refill request received from pharmacy    [facility]      Last Written Prescription Date:  1/31/18  Last Fill Quantity: 180,   # refills: 1  Last Office Visit: 5/7/18  Future Office visit:       Medication is being filled for 1 time refill only due to:  Patient needs labs cmp. / BMP    Rosa Mello RN   [facility], Primary Care

## 2018-07-19 DIAGNOSIS — I10 ESSENTIAL HYPERTENSION WITH GOAL BLOOD PRESSURE LESS THAN 140/90: ICD-10-CM

## 2018-07-19 NOTE — TELEPHONE ENCOUNTER
Last office visit was on 05/07/2018, currently no future visits are scheduled.    Routing refill request to provider for review/approval because:  Pascale given x1 and patient did not follow up, please advise  Labs not current:  Bmp, future already placed    Shelly Rodriguez RN  July 19, 2018 9:25 PM

## 2018-07-20 RX ORDER — LOSARTAN POTASSIUM 50 MG/1
50 TABLET ORAL DAILY
Qty: 90 TABLET | Refills: 0 | Status: SHIPPED | OUTPATIENT
Start: 2018-07-20 | End: 2018-12-26

## 2018-10-12 DIAGNOSIS — E78.5 HYPERLIPIDEMIA LDL GOAL <130: ICD-10-CM

## 2018-10-12 RX ORDER — SIMVASTATIN 20 MG
20 TABLET ORAL AT BEDTIME
Qty: 90 TABLET | Refills: 0 | Status: SHIPPED | OUTPATIENT
Start: 2018-10-12 | End: 2018-12-26

## 2018-10-12 NOTE — TELEPHONE ENCOUNTER
Last office visit:  05/07/2018, no future appointment.    Medication is being filled for 1 time refill only due to:  Patient needs labs lipids. Future labs ordered lipids.     To provider for mail order signing  Renee Hill RN  Care Coordinator  [facility]

## 2018-12-24 DIAGNOSIS — I10 ESSENTIAL HYPERTENSION WITH GOAL BLOOD PRESSURE LESS THAN 140/90: ICD-10-CM

## 2018-12-24 DIAGNOSIS — E78.5 HYPERLIPIDEMIA LDL GOAL <130: ICD-10-CM

## 2018-12-24 NOTE — TELEPHONE ENCOUNTER
Last time prescribed:   Zocor - 10/12/18 , 90 tabs/caps x 0 refills  Aldactone - 1/31/1,8 90 tabs/caps x 1 refills  Cozaar - 7/20/18, 90 tabs/caps x 0 refills  Last office visit: 5/7/18  Next appointment: No Future Appointment Scheduled    Patient had outside labs for BMP and lipid panel on 3/21/17, labs still overdue for this refill request.     Routing refill request to provider for review/approval because:  Pascale given x1 and patient did not follow up, please advise  Labs not current:  Lipids, BMP - orders placed.     MD to auth for mail order request. 90-day supply per mail order pharmacy.     Monica Romero RN

## 2018-12-26 RX ORDER — SPIRONOLACTONE 50 MG/1
50 TABLET, FILM COATED ORAL DAILY
Qty: 90 TABLET | Refills: 0 | Status: SHIPPED | OUTPATIENT
Start: 2018-12-26 | End: 2019-09-11

## 2018-12-26 RX ORDER — SIMVASTATIN 20 MG
20 TABLET ORAL AT BEDTIME
Qty: 90 TABLET | Refills: 0 | Status: SHIPPED | OUTPATIENT
Start: 2018-12-26 | End: 2019-03-21

## 2018-12-26 RX ORDER — LOSARTAN POTASSIUM 50 MG/1
50 TABLET ORAL DAILY
Qty: 90 TABLET | Refills: 0 | Status: SHIPPED | OUTPATIENT
Start: 2018-12-26 | End: 2019-07-12

## 2019-03-20 ENCOUNTER — MYC MEDICAL ADVICE (OUTPATIENT)
Dept: FAMILY MEDICINE | Facility: CLINIC | Age: 69
End: 2019-03-20

## 2019-03-21 DIAGNOSIS — F33.1 MAJOR DEPRESSIVE DISORDER, RECURRENT EPISODE, MODERATE (H): ICD-10-CM

## 2019-03-21 DIAGNOSIS — E78.5 HYPERLIPIDEMIA LDL GOAL <130: ICD-10-CM

## 2019-03-21 RX ORDER — DULOXETIN HYDROCHLORIDE 60 MG/1
60 CAPSULE, DELAYED RELEASE ORAL DAILY
Qty: 90 CAPSULE | Refills: 0 | Status: SHIPPED | OUTPATIENT
Start: 2019-03-21 | End: 2019-07-12

## 2019-03-21 RX ORDER — SIMVASTATIN 20 MG
20 TABLET ORAL AT BEDTIME
Qty: 90 TABLET | Refills: 0 | Status: SHIPPED | OUTPATIENT
Start: 2019-03-21 | End: 2019-07-12

## 2019-03-21 NOTE — TELEPHONE ENCOUNTER
Last visit 5/7/18, no future visit    Medication is being filled for 1 time refill only due to:  Patient needs labs lipids plus - already in chart. Patient needs to be seen because needs PHQ9.     To provider for mail order signing.  Mychart message to pt that he needs fasting labs and appt in clinic.  Renee Hill RN  [facility]

## 2019-04-03 NOTE — TELEPHONE ENCOUNTER
Called patient to remind him of need for labs and upcoming annual visit. He sees cardiologist in [place] where he currently is living, returning to [place] end of May. He will send recent labs from cardiologist to clinic, and he did schedule his annual exam.  Monica Romero RN  04/03/19  9:51 AM

## 2019-07-08 ENCOUNTER — TELEPHONE (OUTPATIENT)
Dept: FAMILY MEDICINE | Facility: CLINIC | Age: 69
End: 2019-07-08

## 2019-07-08 NOTE — TELEPHONE ENCOUNTER
Please see if you can get these records -  Renee Hill RN  [facility]

## 2019-07-08 NOTE — TELEPHONE ENCOUNTER
[facility] Call Center    Phone Message    May a detailed message be left on voicemail: yes    Reason for Call: Other: PT is requesting a call back to see if his records were received from his cardiologist in [place].  Please follow up with the PT.       Action Taken: Message routed to:  [facility]: MARIJA

## 2019-07-08 NOTE — TELEPHONE ENCOUNTER
[facility] Call Center    Phone Message    May a detailed message be left on voicemail: yes    Reason for Call: Other: Pt calling back to say if you have not rec'd his medical records yet please reach out to his Cardiologist directly to get them - he is requesting for you to get all of his records including all of his labs - Dr Donovan Lundberg from [facility] - Ph # [phone] - Thanks.      Action Taken: Message routed to:  [facility]: [facility]

## 2019-07-10 ENCOUNTER — TELEPHONE (OUTPATIENT)
Dept: UROLOGY | Facility: CLINIC | Age: 69
End: 2019-07-10

## 2019-07-10 ENCOUNTER — OFFICE VISIT (OUTPATIENT)
Dept: FAMILY MEDICINE | Facility: CLINIC | Age: 69
End: 2019-07-10
Payer: MEDICARE

## 2019-07-10 VITALS
DIASTOLIC BLOOD PRESSURE: 75 MMHG | WEIGHT: 155.25 LBS | BODY MASS INDEX: 22.92 KG/M2 | HEART RATE: 71 BPM | OXYGEN SATURATION: 99 % | TEMPERATURE: 98.1 F | SYSTOLIC BLOOD PRESSURE: 120 MMHG

## 2019-07-10 DIAGNOSIS — E78.5 HYPERLIPIDEMIA LDL GOAL <130: ICD-10-CM

## 2019-07-10 DIAGNOSIS — N40.1 BENIGN PROSTATIC HYPERPLASIA WITH INCOMPLETE BLADDER EMPTYING: Primary | ICD-10-CM

## 2019-07-10 DIAGNOSIS — R39.14 BENIGN PROSTATIC HYPERPLASIA WITH INCOMPLETE BLADDER EMPTYING: Primary | ICD-10-CM

## 2019-07-10 DIAGNOSIS — R00.2 PALPITATIONS: ICD-10-CM

## 2019-07-10 DIAGNOSIS — Z23 NEED FOR PNEUMOCOCCAL VACCINATION: ICD-10-CM

## 2019-07-10 DIAGNOSIS — I10 ESSENTIAL HYPERTENSION WITH GOAL BLOOD PRESSURE LESS THAN 140/90: ICD-10-CM

## 2019-07-10 DIAGNOSIS — S22.41XD CLOSED FRACTURE OF MULTIPLE RIBS OF RIGHT SIDE WITH ROUTINE HEALING, SUBSEQUENT ENCOUNTER: ICD-10-CM

## 2019-07-10 RX ORDER — METOPROLOL SUCCINATE 50 MG/1
50 TABLET, EXTENDED RELEASE ORAL
COMMUNITY
Start: 2019-02-04

## 2019-07-10 ASSESSMENT — PATIENT HEALTH QUESTIONNAIRE - PHQ9: 5. POOR APPETITE OR OVEREATING: NEARLY EVERY DAY

## 2019-07-10 ASSESSMENT — ANXIETY QUESTIONNAIRES
1. FEELING NERVOUS, ANXIOUS, OR ON EDGE: NEARLY EVERY DAY
IF YOU CHECKED OFF ANY PROBLEMS ON THIS QUESTIONNAIRE, HOW DIFFICULT HAVE THESE PROBLEMS MADE IT FOR YOU TO DO YOUR WORK, TAKE CARE OF THINGS AT HOME, OR GET ALONG WITH OTHER PEOPLE: SOMEWHAT DIFFICULT
5. BEING SO RESTLESS THAT IT IS HARD TO SIT STILL: NOT AT ALL
2. NOT BEING ABLE TO STOP OR CONTROL WORRYING: SEVERAL DAYS
3. WORRYING TOO MUCH ABOUT DIFFERENT THINGS: SEVERAL DAYS
6. BECOMING EASILY ANNOYED OR IRRITABLE: MORE THAN HALF THE DAYS
7. FEELING AFRAID AS IF SOMETHING AWFUL MIGHT HAPPEN: NEARLY EVERY DAY
GAD7 TOTAL SCORE: 13

## 2019-07-10 NOTE — TELEPHONE ENCOUNTER
[facility] Call Center    Phone Message    May a detailed message be left on voicemail: yes    Reason for Call: Other: Pt has a referral but needs to work around his travel schedule.  Pt needs to leave on 8/6 and suspects he needs surgery and recovery before that date.  Please call to schedule an earlier date after review of records.    Action Taken: Message routed to:  [facility] urology jennifer

## 2019-07-10 NOTE — PROGRESS NOTES
Screening Questionnaire for Adult Immunization    Are you sick today?   No   Do you have allergies to medications, food, a vaccine component or latex?   No   Have you ever had a serious reaction after receiving a vaccination?   No   Do you have a long-term health problem with heart disease, lung disease, asthma, kidney disease, metabolic disease (e.g. diabetes), anemia, or other blood disorder?   No   Do you have cancer, leukemia, HIV/AIDS, or any other immune system problem?   No   In the past 3 months, have you taken medications that affect  your immune system, such as prednisone, other steroids, or anticancer drugs; drugs for the treatment of rheumatoid arthritis, Crohn s disease, or psoriasis; or have you had radiation treatments?   No   Have you had a seizure, or a brain or other nervous system problem?   No   During the past year, have you received a transfusion of blood or blood     products, or been given immune (gamma) globulin or antiviral drug?   No   For women: Are you pregnant or is there a chance you could become        pregnant during the next month?   No   Have you received any vaccinations in the past 4 weeks?   No     Immunization questionnaire answers were all negative.        Per orders of Dr. Tomas Pena, injection of Pneumovax 23 given by Chandu Chung. Patient instructed to remain in clinic for 15 minutes afterwards, and to report any adverse reaction to me immediately.       Screening performed by Chandu Chung on 7/10/2019 at 3:33 PM.

## 2019-07-10 NOTE — NURSING NOTE
69 year old  Chief Complaint   Patient presents with     Results     cardiology     Letter Request     for travel with service dog       Blood pressure 120/75, pulse 71, temperature 98.1  F (36.7  C), temperature source Oral, weight 70.4 kg (155 lb 4 oz), SpO2 99 %. Body mass index is 22.92 kg/m .  Patient Active Problem List   Diagnosis     Hypotestosteronism     Vitamin D deficiency     Hyperlipidemia LDL goal <130     Osteopenia     Preventative health care     Anxiety     Cervical pain (neck)     PTSD (post-traumatic stress disorder)     CHF (congestive heart failure) (H)     Bleeding external hemorrhoids     Excessive drinking of alcohol     Chronic pain of left knee     Intractable pain     Psoriasis       Wt Readings from Last 2 Encounters:   07/10/19 70.4 kg (155 lb 4 oz)   05/07/18 68.4 kg (150 lb 12 oz)     BP Readings from Last 3 Encounters:   07/10/19 120/75   05/07/18 136/77   05/15/17 92/62         Current Outpatient Medications   Medication     acyclovir (ZOVIRAX) 800 MG tablet     aspirin 81 MG tablet     bisacodyl (DULCOLAX) 5 MG EC tablet     chlorpheniramine-pseudoePHEDrine (DECONAMINE SR) 8-120 MG per capsule     clobetasol (TEMOVATE) 0.05 % cream     clobetasol propionate 0.05 % SHAM     clotrimazole-betamethasone (LOTRISONE) lotion     desonide (DESOWEN) 0.05 % cream     diphenhydrAMINE (BENADRYL) 25 MG tablet     DULoxetine (CYMBALTA) 60 MG capsule     EPINEPHrine (EPIPEN 2-HUGO) 0.3 MG/0.3ML injection     ketoconazole (NIZORAL) 2 % cream     losartan (COZAAR) 50 MG tablet     magnesium 250 MG tablet     metoprolol succinate ER (TOPROL-XL) 50 MG 24 hr tablet     pantoprazole (PROTONIX) 40 MG EC tablet     polyethylene glycol 3350 POWD     SIMETHICONE     simvastatin (ZOCOR) 20 MG tablet     spironolactone (ALDACTONE) 50 MG tablet     apremilast (OTEZLA) 30 MG tablet     B Complex Vitamins (VITAMIN B COMPLEX PO)     carvedilol (COREG) 6.25 MG tablet     Cholecalciferol (VITAMIN D) 1000 UNITS  capsule     diazepam (VALIUM) 2 MG tablet     No current facility-administered medications for this visit.        Social History     Tobacco Use     Smoking status: Current Every Day Smoker     Types: Cigars, cigarillos or filtered cigars     Smokeless tobacco: Never Used   Substance Use Topics     Alcohol use: No     Drug use: None       Health Maintenance Due   Topic Date Due     HF ACTION PLAN  1950     ADVANCE CARE PLANNING  1950     DEPRESSION ACTION PLAN  1950     ZOSTER IMMUNIZATION (2 of 3) 08/28/2013     FALL RISK ASSESSMENT  03/31/2016     DEXA  07/12/2016     MEDICARE ANNUAL WELLNESS VISIT  08/04/2016     PNEUMOCOCCAL IMMUNIZATION 65+ LOW/MEDIUM RISK (2 of 2 - PPSV23) 08/04/2016     CBC  08/28/2016     BMP  02/28/2017     LIPID  08/31/2017     PHQ-9  11/07/2018     ALT  05/07/2019     VERONIKA ASSESSMENT  05/07/2019       No results found for: PAP      July 10, 2019 2:24 PM

## 2019-07-10 NOTE — PROGRESS NOTES
"CHIEF COMPLAINT: Urinary urgency       HISTORY OF PRESENT ILLNESS: [place] Salamanca is a 69 year old male who is overall doing well. He recently enjoyed traveling to several  countries recently. He also is planning to go on 5 cruises this year and plans to do another 5 next year. (He states he doing this since he doesn't \"have much time left.\"  He states this simply as a fact, not out of depression.)    [place] reports he has had some urinary urgency. He has difficulty initiating his stream and the strength of the stream has decreased. He also tends to have urinary frequency which he is bothered by most. He would like to avoid Tamsulosin and is interested in meeting with a urologist before he moves back to [place] in August.     He was seen by cardiology for his palpitations. He is currently taking metoprolol 50 mg daily and has no side effects with the medication.     He cracked two ribs (5&6) while traveling in [place] and reports they are healing quickly. He was seen by an orthopedist there and was very happy with his care.        FAMILY, SOCIAL AND PAST SURGICAL HISTORIES:  Unchanged.       MEDICATIONS:  Carefully reviewed.       REVIEW OF SYSTEMS:  A 10-point review is negative except as otherwise noted.      OBJECTIVE:    GENERAL: [place] is in no distress.  Affect is upbeat.     VITAL SIGNS: /75 (BP Location: Left arm, Patient Position: Sitting, Cuff Size: Adult Regular)   Pulse 71   Temp 98.1  F (36.7  C) (Oral)   Wt 70.4 kg (155 lb 4 oz)   SpO2 99%   BMI 22.92 kg/m    HEENT:  Head is normocephalic, atraumatic.  LUNGS:  Clear to auscultation bilaterally.   HEART:  Regular rate and rhythm without murmur.   ABDOMEN:  Benign.     EXTREMITIES:  Ankles free of any edema.   SKIN:  Warm and dry.       LABORATORY DATA: PSA, pending      ASSESSMENT AND PLAN:   1. BPH: PSA, pending. Referral to urology given.   2. Palpitations: Metoprolol 50 mg daily and doing well with medication  3. Two cracked ribs: " Reports is healing quickly.   4. HCM: Due for PCV-23, given today  5. Needs a letter for flights indicating that his anxiety is such that he needs his dog with him.  This was completed.    Call with any problems or questions.     I, Harmeet Evans, am serving as a scribe to document services personally performed by Dr. Tomas Pena, based on data collection and the provider's statements to me. Dr. Pena has reviewed, edited, and approved the above note.     --Tomas Pena MD

## 2019-07-11 LAB — PSA SERPL-ACNC: 0.14 UG/L (ref 0–4)

## 2019-07-11 ASSESSMENT — ANXIETY QUESTIONNAIRES: GAD7 TOTAL SCORE: 13

## 2019-07-11 NOTE — TELEPHONE ENCOUNTER
[facility] Call Center    Phone Message    May a detailed message be left on voicemail: yes    Reason for Call: Other: Pt calling back again still looking for someone to call about trying to fit him into the schedule for an appt and still leave time afterward prior to 8/6 when he leaves town for a surgery to be scheduled if needed.     Action Taken: Message routed to:  [facility] urology

## 2019-07-11 NOTE — TELEPHONE ENCOUNTER
Patient called and told to come in tomorrow at 4pm with gunnar gomez for discussion and hopefully a plan of action that will help him get this resolved in his very strict time frame. Ange Ramsay LPN Staff Nurse

## 2019-07-12 ENCOUNTER — PRE VISIT (OUTPATIENT)
Dept: UROLOGY | Facility: CLINIC | Age: 69
End: 2019-07-12

## 2019-07-12 ENCOUNTER — OFFICE VISIT (OUTPATIENT)
Dept: UROLOGY | Facility: CLINIC | Age: 69
End: 2019-07-12
Payer: MEDICARE

## 2019-07-12 VITALS
BODY MASS INDEX: 22.75 KG/M2 | WEIGHT: 153.6 LBS | HEART RATE: 83 BPM | SYSTOLIC BLOOD PRESSURE: 133 MMHG | HEIGHT: 69 IN | DIASTOLIC BLOOD PRESSURE: 80 MMHG

## 2019-07-12 DIAGNOSIS — K21.9 GASTROESOPHAGEAL REFLUX DISEASE, ESOPHAGITIS PRESENCE NOT SPECIFIED: ICD-10-CM

## 2019-07-12 DIAGNOSIS — N40.1 BENIGN PROSTATIC HYPERPLASIA WITH LOWER URINARY TRACT SYMPTOMS, SYMPTOM DETAILS UNSPECIFIED: Primary | ICD-10-CM

## 2019-07-12 DIAGNOSIS — E78.5 HYPERLIPIDEMIA LDL GOAL <130: ICD-10-CM

## 2019-07-12 DIAGNOSIS — F33.1 MAJOR DEPRESSIVE DISORDER, RECURRENT EPISODE, MODERATE (H): ICD-10-CM

## 2019-07-12 DIAGNOSIS — I10 ESSENTIAL HYPERTENSION WITH GOAL BLOOD PRESSURE LESS THAN 140/90: ICD-10-CM

## 2019-07-12 DIAGNOSIS — R39.15 URINARY URGENCY: ICD-10-CM

## 2019-07-12 RX ORDER — PANTOPRAZOLE SODIUM 40 MG/1
TABLET, DELAYED RELEASE ORAL
Qty: 180 TABLET | Refills: 3 | Status: SHIPPED | OUTPATIENT
Start: 2019-07-12

## 2019-07-12 RX ORDER — DULOXETIN HYDROCHLORIDE 60 MG/1
60 CAPSULE, DELAYED RELEASE ORAL DAILY
Qty: 90 CAPSULE | Refills: 0 | Status: SHIPPED | OUTPATIENT
Start: 2019-07-12 | End: 2019-09-20

## 2019-07-12 RX ORDER — SIMVASTATIN 20 MG
20 TABLET ORAL AT BEDTIME
Qty: 90 TABLET | Refills: 0 | Status: SHIPPED | OUTPATIENT
Start: 2019-07-12 | End: 2019-09-20

## 2019-07-12 RX ORDER — LOSARTAN POTASSIUM 50 MG/1
50 TABLET ORAL DAILY
Qty: 90 TABLET | Refills: 0 | Status: SHIPPED | OUTPATIENT
Start: 2019-07-12 | End: 2019-09-20

## 2019-07-12 ASSESSMENT — ENCOUNTER SYMPTOMS
EXERCISE INTOLERANCE: 0
SYNCOPE: 0
WEAKNESS: 0
SKIN CHANGES: 0
TREMORS: 0
DIARRHEA: 0
MUSCLE WEAKNESS: 0
NECK PAIN: 1
MUSCLE CRAMPS: 1
HYPOTENSION: 0
NAUSEA: 0
ORTHOPNEA: 0
SLEEP DISTURBANCES DUE TO BREATHING: 0
DYSURIA: 0
FLANK PAIN: 1
CONSTIPATION: 1
ARTHRALGIAS: 1
NUMBNESS: 1
VOMITING: 0
DISTURBANCES IN COORDINATION: 0
BOWEL INCONTINENCE: 0
PANIC: 0
SPEECH CHANGE: 0
NERVOUS/ANXIOUS: 1
ABDOMINAL PAIN: 0
HEMATURIA: 0
INSOMNIA: 1
BLOOD IN STOOL: 0
STIFFNESS: 0
TINGLING: 1
JOINT SWELLING: 1
MEMORY LOSS: 1
NAIL CHANGES: 0
HYPERTENSION: 1
RECTAL PAIN: 0
DIZZINESS: 0
LEG PAIN: 1
POOR WOUND HEALING: 0
BLOATING: 0
JAUNDICE: 0
LOSS OF CONSCIOUSNESS: 0
HEADACHES: 0
DEPRESSION: 1
PARALYSIS: 0
DIFFICULTY URINATING: 1
LIGHT-HEADEDNESS: 0
HEARTBURN: 1
SEIZURES: 0
MYALGIAS: 0
BACK PAIN: 1
DECREASED CONCENTRATION: 1
PALPITATIONS: 1

## 2019-07-12 ASSESSMENT — PAIN SCALES - GENERAL: PAINLEVEL: MODERATE PAIN (4)

## 2019-07-12 ASSESSMENT — MIFFLIN-ST. JEOR: SCORE: 1457.42

## 2019-07-12 NOTE — TELEPHONE ENCOUNTER
Cozaar    Last office visit: 7/10/19  Next appointment: none  Medication last refilled: 12/26/18 #90+0RF    Zocor Cymbalta    Last office visit: 7/10/19  Next appointment: none  Medication last refilled: 3/21/19 #90+0RF    Pantoprazole    Last office visit: 7/10/19  Next appointment: none  Medication last refilled: 5/15/18 #180 +3RF    Bessy

## 2019-07-12 NOTE — NURSING NOTE
"No chief complaint on file.      Blood pressure 133/80, pulse 83, height 1.753 m (5' 9.02\"), weight 69.7 kg (153 lb 9.6 oz). Body mass index is 22.67 kg/m .    Patient Active Problem List   Diagnosis     Hypotestosteronism     Vitamin D deficiency     Hyperlipidemia LDL goal <130     Osteopenia     Preventative health care     Anxiety     Cervical pain (neck)     PTSD (post-traumatic stress disorder)     CHF (congestive heart failure) (H)     Bleeding external hemorrhoids     Excessive drinking of alcohol     Chronic pain of left knee     Intractable pain     Psoriasis       Allergies   Allergen Reactions     Bee Venom      Kiwi      Wasps [Hornets]        Current Outpatient Medications   Medication Sig Dispense Refill     acyclovir (ZOVIRAX) 800 MG tablet Take 1 tablet by mouth 5 times daily. (every 4 hours) For the next 7 - 10 days. 50 tablet 1     aspirin 81 MG tablet Take  by mouth daily.       bisacodyl (DULCOLAX) 5 MG EC tablet Take 15 mg by mouth daily as needed        chlorpheniramine-pseudoePHEDrine (DECONAMINE SR) 8-120 MG per capsule Take 1 capsule by mouth 2 times daily as needed.       clobetasol (TEMOVATE) 0.05 % cream Apply topically 2 times daily       clobetasol propionate 0.05 % SHAM        clotrimazole-betamethasone (LOTRISONE) lotion Apply  topically as needed.       desonide (DESOWEN) 0.05 % cream Apply  topically as needed.       diphenhydrAMINE (BENADRYL) 25 MG tablet Take 25 mg by mouth every 6 hours as needed. When stung or have allergic reaction to food, use with Epipen.       DULoxetine (CYMBALTA) 60 MG capsule Take 1 capsule (60 mg) by mouth daily Needs clinic visit for further refills 90 capsule 0     EPINEPHrine (EPIPEN 2-HUGO) 0.3 MG/0.3ML injection Inject 0.3 mLs (0.3 mg) into the muscle once as needed for anaphylaxis 2 each 1     ketoconazole (NIZORAL) 2 % cream Apply  topically daily as needed.       losartan (COZAAR) 50 MG tablet Take 1 tablet (50 mg) by mouth daily Needs lab work " for further refills. 90 tablet 0     magnesium 250 MG tablet Take 1 tablet by mouth as needed        metoprolol succinate ER (TOPROL-XL) 50 MG 24 hr tablet Take 50 mg by mouth       pantoprazole (PROTONIX) 40 MG EC tablet Take 1 tab po twice daily 180 tablet 3     polyethylene glycol 3350 POWD Take 17 gm daily by mouth, dissolved in 8 oz fluid. (Patient taking differently: as needed Take 17 gm daily by mouth, dissolved in 8 oz fluid.) 3 Bottle 1     SIMETHICONE Take 1-2 a week.       simvastatin (ZOCOR) 20 MG tablet Take 1 tablet (20 mg) by mouth At Bedtime Need labs and clinic visit  for future refills. 90 tablet 0     spironolactone (ALDACTONE) 50 MG tablet Take 1 tablet (50 mg) by mouth daily Needs labs for future refills. 90 tablet 0     apremilast (OTEZLA) 30 MG tablet Take 30 mg by mouth daily Reported on 5/15/2017       B Complex Vitamins (VITAMIN B COMPLEX PO) Take 1 tablet by mouth daily.       carvedilol (COREG) 6.25 MG tablet Take 1 tablet (6.25 mg) by mouth 2 times daily (with meals) (Patient not taking: Reported on 7/10/2019) 180 tablet 1     Cholecalciferol (VITAMIN D) 1000 UNITS capsule Take 1 capsule by mouth daily.       diazepam (VALIUM) 2 MG tablet Take 1 tablet (2 mg) by mouth nightly as needed for anxiety or sleep (Patient not taking: Reported on 5/7/2018) 30 tablet 1       Social History     Tobacco Use     Smoking status: Current Every Day Smoker     Types: Cigars, cigarillos or filtered cigars     Smokeless tobacco: Never Used   Substance Use Topics     Alcohol use: No     Drug use: None       Mirian Temple LPN  7/12/2019  3:55 PM

## 2019-07-12 NOTE — PROGRESS NOTES
Chief Complaint:   Urinary urgency         History of Present Illness:    Montana Salamanca is a very pleasant 69 year old male who presents for evaluation of the above. Per chart review, he was recently seen by his PCP, Dr. Pena, on 7/10/19 for this issue. At their visit, he noted an increase in urinary urgency, and found that it had become difficult initiating his stream, along with a decrease in the strength of his stream. He also reported occasional frequency, which is his most bothersome symptom. PSA was drawn that day, and was 0.14. He reportedly would like to avoid tamsulosin. He is planning to move back to [place] in August.     Today, he states that his primary issue is that he is needing to return to the bathroom immediately after he is done voiding to go again. He states that he previously saw a urologist in [place], [place], who prescribed him tamsulosin. He trialed this, but didn't seem to think he tolerated it well. However, he is open to trialing this again, this time taking it at night if he is to develop side effects. He otherwise denies dysuria, pyuria, or hematuria. Of note, he is planning to move back to [place] in the next few weeks.          Past Medical History:   Hypotestosteronism  Vitamin D deficiency   Hyperlipidemia  Anxiety  PTSD  CHF  Hemorrhoids  ETOH  Chronic pain         Past Surgical History:   Reviewed--none pertinent          Medications     Current Outpatient Medications   Medication     acyclovir (ZOVIRAX) 800 MG tablet     aspirin 81 MG tablet     bisacodyl (DULCOLAX) 5 MG EC tablet     chlorpheniramine-pseudoePHEDrine (DECONAMINE SR) 8-120 MG per capsule     clobetasol (TEMOVATE) 0.05 % cream     clobetasol propionate 0.05 % SHAM     clotrimazole-betamethasone (LOTRISONE) lotion     desonide (DESOWEN) 0.05 % cream     diphenhydrAMINE (BENADRYL) 25 MG tablet     DULoxetine (CYMBALTA) 60 MG capsule     EPINEPHrine (EPIPEN 2-HUGO) 0.3 MG/0.3ML injection     ketoconazole (NIZORAL)  2 % cream     losartan (COZAAR) 50 MG tablet     magnesium 250 MG tablet     metoprolol succinate ER (TOPROL-XL) 50 MG 24 hr tablet     pantoprazole (PROTONIX) 40 MG EC tablet     polyethylene glycol 3350 POWD     SIMETHICONE     simvastatin (ZOCOR) 20 MG tablet     spironolactone (ALDACTONE) 50 MG tablet     apremilast (OTEZLA) 30 MG tablet     B Complex Vitamins (VITAMIN B COMPLEX PO)     carvedilol (COREG) 6.25 MG tablet     Cholecalciferol (VITAMIN D) 1000 UNITS capsule     diazepam (VALIUM) 2 MG tablet     No current facility-administered medications for this visit.             Family History:   No family history on file.         Social History:     Social History     Socioeconomic History     Marital status: Single     Spouse name: Not on file     Number of children: Not on file     Years of education: Not on file     Highest education level: Not on file   Occupational History     Not on file   Social Needs     Financial resource strain: Not on file     Food insecurity:     Worry: Not on file     Inability: Not on file     Transportation needs:     Medical: Not on file     Non-medical: Not on file   Tobacco Use     Smoking status: Current Every Day Smoker     Types: Cigars, cigarillos or filtered cigars     Smokeless tobacco: Never Used   Substance and Sexual Activity     Alcohol use: No     Drug use: Not on file     Sexual activity: Not on file   Lifestyle     Physical activity:     Days per week: Not on file     Minutes per session: Not on file     Stress: Not on file   Relationships     Social connections:     Talks on phone: Not on file     Gets together: Not on file     Attends Gnosticist service: Not on file     Active member of club or organization: Not on file     Attends meetings of clubs or organizations: Not on file     Relationship status: Not on file     Intimate partner violence:     Fear of current or ex partner: Not on file     Emotionally abused: Not on file     Physically abused: Not on file  "    Forced sexual activity: Not on file   Other Topics Concern     Parent/sibling w/ CABG, MI or angioplasty before 65F 55M? Not Asked   Social History Narrative     Not on file            Allergies:   Bee venom; Kiwi; and Wasps [hornets]         Review of Systems:  From intake questionnaire   Negative 14 system review except as noted on HPI, nurse's note.         Physical Exam:   Patient is a 69 year old  male   Vitals: Blood pressure 133/80, pulse 83, height 1.753 m (5' 9.02\"), weight 69.7 kg (153 lb 9.6 oz).  General Appearance Adult: Alert, no acute distress, oriented  Lungs: no respiratory distress, or pursed lip breathing  Heart: No obvious jugular venous distension present  Abdomen: soft, nontender, no organomegaly or masses, Body mass index is 22.67 kg/m .  Musculoskeltal: extremities normal, no peripheral edema  Skin: no suspicious lesions or rashes  Neuro: Alert, oriented, speech and mentation normal  : deferred     Uroflow and Post-Void Residual by Bladder Scan   Urinary Flow Rate  Residual Volume by Ultrasound: 34 mL      Labs and Pathology:    I personally reviewed all applicable laboratory data and went over findings with patient  Significant for:    CBC RESULTS:  Recent Labs   Lab Test 08/28/15  1111 08/04/15  1539 05/14/11  0640 05/13/11  2230   WBC 6.7 5.1 7.8 11.1*   HGB 11.8* 13.3 12.2* 13.1*    164 177 218        BMP RESULTS:  Recent Labs   Lab Test 08/31/16  1030 08/04/15  1339 08/25/14  1115 08/25/14  1018 07/03/13  0950  05/14/11  0640 05/13/11  2230   .0* 128.0*  --  126.0* 135.0   < > 133 132*   POTASSIUM 4.0 5.1  --  3.5 4.5   < > 4.2 4.6   CHLORIDE 94.0 89.0* 80*  --  94.0   < > 95 91*   CO2 29.0 26.0  --  30.0 30.0   < > 28 25   ANIONGAP  --   --   --   --   --   --  10 16   GLC 99.0 115.0*  --  105.0 101.0   < > 98 96   BUN 7.0 20.0  --  16.0 17.0   < > 26 33*   CR 1.2 1.1  --  1.2 1.0   < > 1.32* 1.51*   GFRESTIMATED  --   --   --   --   --   --  55* 47*   GFRESTBLACK  -- "   --   --   --   --   --  67 57*   MICHAEL 9.8 9.1  --  9.6 9.8   < > 8.9 9.6    < > = values in this interval not displayed.       UA RESULTS:   Recent Labs   Lab Test 05/23/11  1122   SG 1.010   URINEPH 7.0   NITRITE neg       PSA RESULTS  PSA   Date Value Ref Range Status   07/10/2019 0.14 0 - 4 ug/L Final     Comment:     Assay Method:  Chemiluminescence using Siemens Vista analyzer   05/07/2018 0.07 0 - 4 ug/L Final     Comment:     Assay Method:  Chemiluminescence using Siemens Vista analyzer   08/25/2014 0.78 0 - 4 ug/L Final         Imaging:    I personally reviewed all applicable imaging            Assessment and Plan:     Assessment: 69 year old male with urinary urgency and double-voiding, who previously trialed tamsulosin, and does not seem to think he tolerated it at that time. However, he does not recall the circumstances of this. He is open to trialing this again, or an alternative which may have fewer side effects. We discussed that alfuzosin may be an alternative option, with a better side effect profile, but he would prefer to check insurance coverage of these options first.     Plan:  -We can consider trialing tamsulosin or alfuzosin. Patient to look into coverage for these medications.   -Instructed to contact our clinic regarding which medication he would like to try, whether by phone or via [employer].   -Follow up as needed in the future, as the patient is planning to more back to [place] in a few weeks.      Paulette Allen, CNP  Department of Urology

## 2019-07-12 NOTE — PATIENT INSTRUCTIONS
UROLOGY CLINIC VISIT PATIENT INSTRUCTIONS    1) We can consider trialing either tamsulosin or alfuzosin. Look into coverage for these medications.   2) Contact our clinic regarding which medication you would like to try, whether by phone or via [employer].       If you have any issues, questions or concerns in the meantime, do not hesitate to contact us at [phone] or via [employer].     It was a pleasure meeting with you today.  Thank you for allowing me and my team the privilege of caring for you today.  YOU are the reason we are here, and I truly hope we provided you with the excellent service you deserve.  Please let us know if there is anything else we can do for you so that we can be sure you are leaving completely satisfied with your care experience.    Paulette Allen, CNP

## 2019-07-12 NOTE — TELEPHONE ENCOUNTER
Routing refill request to provider for review/approval because:  Pascale given x1 and patient did not follow up, please advise   Patient needs labs lipid panel plus, bmp. Future labs ordered yes.  Pt just in appt , but the futures that were there were not drawn .  Re-entering labs again today.     See he had a large red top drawn 2 days ago,  Our lab is sending request to add on the 2 needed labs     Also needs PHQ9 updated.    To MD to auth for mail order also     Shelly Rodriguez RN  July 12, 2019 3:33 PM

## 2019-07-12 NOTE — TELEPHONE ENCOUNTER
MEDICAL RECORDS REQUEST   [facility]  Urology Clinic  [address] [place] [address]  PHONE: [phone]  Fax: [phone]        FUTURE VISIT INFORMATION                                                   LATANYA Lozano: 1950 scheduled for future visit at [facility]    APPOINTMENT INFORMATION:    Date:19 4:30PM     Provider:  Paulette Allen RN     Reason for Visit/Diagnosis:  bph and incomplete bladder emptying and also urinary urgency      REFERRAL INFORMATION:    Referring provider:  Tomas Pena    Specialty: MD     Referring providers clinic:  [facility]     Clinic contact number:  [phone]    RECORDS REQUESTED FOR VISIT                                                     NOTES  STATUS/DETAILS   OFFICE NOTE from referring provider  yes   OFFICE NOTE from other specialist  no   DISCHARGE SUMMARY from hospital  no   DISCHARGE REPORT from the ER  no   OPERATIVE REPORT  no   MEDICATION LIST  no   LABS     URINALYSIS (UA)  no     PRE-VISIT CHECKLIST      Record collection complete Yes- All recs in Epic    Appointment appropriately scheduled           (right time/right provider) Yes   MyChart activation Yes   Questionnaire complete If no, please explain: In process      Completed by: Crystal Hudson

## 2019-07-15 DIAGNOSIS — N40.1 BENIGN PROSTATIC HYPERPLASIA WITH URINARY FREQUENCY: Primary | ICD-10-CM

## 2019-07-15 DIAGNOSIS — R35.0 BENIGN PROSTATIC HYPERPLASIA WITH URINARY FREQUENCY: Primary | ICD-10-CM

## 2019-07-15 RX ORDER — ALFUZOSIN HYDROCHLORIDE 10 MG/1
10 TABLET, EXTENDED RELEASE ORAL DAILY
Qty: 90 TABLET | Refills: 3 | Status: SHIPPED | OUTPATIENT
Start: 2019-07-15 | End: 2020-04-07

## 2019-07-16 LAB
ALT SERPL W P-5'-P-CCNC: 29 U/L (ref 0–70)
ANION GAP SERPL CALCULATED.3IONS-SCNC: 8 MMOL/L (ref 3–14)
AST SERPL W P-5'-P-CCNC: 27 U/L (ref 0–45)
BUN SERPL-MCNC: 20 MG/DL (ref 7–30)
CALCIUM SERPL-MCNC: 9.5 MG/DL (ref 8.5–10.1)
CHLORIDE SERPL-SCNC: 92 MMOL/L (ref 94–109)
CHOLEST SERPL-MCNC: 209 MG/DL
CO2 SERPL-SCNC: 27 MMOL/L (ref 20–32)
CREAT SERPL-MCNC: 1.26 MG/DL (ref 0.66–1.25)
GFR SERPL CREATININE-BSD FRML MDRD: 58 ML/MIN/{1.73_M2}
GLUCOSE SERPL-MCNC: 92 MG/DL (ref 70–99)
HDLC SERPL-MCNC: 133 MG/DL
LDLC SERPL CALC-MCNC: 54 MG/DL
NONHDLC SERPL-MCNC: 76 MG/DL
POTASSIUM SERPL-SCNC: 5 MMOL/L (ref 3.4–5.3)
SODIUM SERPL-SCNC: 128 MMOL/L (ref 133–144)
TRIGL SERPL-MCNC: 112 MG/DL

## 2019-09-11 DIAGNOSIS — I10 ESSENTIAL HYPERTENSION WITH GOAL BLOOD PRESSURE LESS THAN 140/90: ICD-10-CM

## 2019-09-11 DIAGNOSIS — K59.00 CONSTIPATION, UNSPECIFIED CONSTIPATION TYPE: ICD-10-CM

## 2019-09-11 RX ORDER — POLYETHYLENE GLYCOL 3350
POWDER (GRAM) MISCELLANEOUS
Qty: 3 BOTTLE | Refills: 3 | Status: SHIPPED | OUTPATIENT
Start: 2019-09-11 | End: 2019-11-08

## 2019-09-11 RX ORDER — SPIRONOLACTONE 50 MG/1
50 TABLET, FILM COATED ORAL DAILY
Qty: 90 TABLET | Refills: 1 | Status: SHIPPED | OUTPATIENT
Start: 2019-09-11 | End: 2019-12-10

## 2019-09-13 ENCOUNTER — NURSE TRIAGE (OUTPATIENT)
Dept: NURSING | Facility: CLINIC | Age: 69
End: 2019-09-13

## 2019-09-13 NOTE — TELEPHONE ENCOUNTER
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  States has bowel blockage and is unable to urinate.  Took Miralax with no results.  Last bowel movement was 11 days ago; \"brown drops of stuff leaking\".  Last urination at 3AM this date.  Difficult to triage as patient very impatient.  Advised to go to ED now at which point patient stated he was in [place] and would go to [place] and hung up.    Reason for Disposition    [1] Unable to urinate (or only a few drops) > 4 hours AND     [2] bladder feels very full (e.g., palpable bladder or strong urge to urinate)    Additional Information    Negative: Abdomen pain is the main symptom and adult male    Negative: Abdomen pain is the main symptom and adult female    Negative: Rectal bleeding or blood in stool is the main symptom    Negative: Patient sounds very sick or weak to the triager    Negative: Followed a genital area injury    Negative: Followed a genital area injury (penis, scrotum)    Negative: Vaginal discharge    Negative: Pus (white, yellow) or bloody discharge from end of penis    Negative: [1] Taking antibiotic for urinary tract infection (UTI) AND [2] female    Negative: [1] Taking antibiotic for urinary tract infection (UTI) AND [2] male    Negative: [1] Discomfort (pain, burning or stinging) when passing urine AND [2] pregnant    Negative: [1] Discomfort (pain, burning or stinging) when passing urine AND [2] postpartum < 1 month    Negative: [1] Discomfort (pain, burning or stinging) when passing urine AND [2] female    Negative: [1] Discomfort (pain, burning or stinging) when passing urine AND [2] male    Negative: Pain or itching in the vulvar area    Negative: Pain in scrotum is main symptom    Negative: Blood in the urine is main symptom    Negative: Symptoms arising from use of a urinary catheter (Davison or Coude)    Negative: Shock suspected (e.g., cold/pale/clammy skin, too weak to stand, low BP, rapid pulse)    Negative: Sounds like a life-threatening " emergency to the triager    Negative: Constant abdominal pain lasting > 2 hours    Negative: Vomiting bile (green color)    Negative: Vomiting and abdomen looks much more swollen than usual    Negative: Rectal pain or fullness from fecal impaction (rectum full of stool) and NOT better after SITZ bath, suppository or enema    Negative: Abdomen is more swollen than usual    Last bowel movement (BM) > 4 days ago    Protocols used: URINARY SYMPTOMS-A-AH, CONSTIPATION-A-OH

## 2019-09-20 DIAGNOSIS — E78.5 HYPERLIPIDEMIA LDL GOAL <130: ICD-10-CM

## 2019-09-20 DIAGNOSIS — I10 ESSENTIAL HYPERTENSION WITH GOAL BLOOD PRESSURE LESS THAN 140/90: ICD-10-CM

## 2019-09-20 DIAGNOSIS — F33.1 MAJOR DEPRESSIVE DISORDER, RECURRENT EPISODE, MODERATE (H): ICD-10-CM

## 2019-09-20 RX ORDER — SIMVASTATIN 20 MG
20 TABLET ORAL AT BEDTIME
Qty: 90 TABLET | Refills: 2 | Status: SHIPPED | OUTPATIENT
Start: 2019-09-20 | End: 2020-04-29

## 2019-09-20 RX ORDER — LOSARTAN POTASSIUM 50 MG/1
50 TABLET ORAL DAILY
Qty: 90 TABLET | Refills: 2 | Status: SHIPPED | OUTPATIENT
Start: 2019-09-20 | End: 2020-04-08

## 2019-09-20 RX ORDER — DULOXETIN HYDROCHLORIDE 60 MG/1
60 CAPSULE, DELAYED RELEASE ORAL DAILY
Qty: 90 CAPSULE | Refills: 0 | Status: SHIPPED | OUTPATIENT
Start: 2019-09-20 | End: 2019-11-08

## 2019-09-20 NOTE — TELEPHONE ENCOUNTER
Cozaar Last time prescribed: 7/12/19 , 90 tabs/caps x 0 refills  Zocor Last time prescribed: 7/12/19 , 90 tabs/caps x 0 refills  Cymbalta Last time prescribed: 7/12/19 , 90 tabs/caps x 0 refills  Last office visit: 7/10/19  Next appointment: No Future Appointment Scheduled      Prescription approved per [facility] Refill Protocol.  Monica Romero RN  09/20/19  11:53 AM

## 2019-09-25 DIAGNOSIS — K59.00 CONSTIPATION, UNSPECIFIED CONSTIPATION TYPE: Primary | ICD-10-CM

## 2019-09-25 RX ORDER — LACTULOSE 10 G/15ML
10 SOLUTION ORAL 2 TIMES DAILY
Qty: 2700 ML | Refills: 0 | Status: SHIPPED | OUTPATIENT
Start: 2019-09-25 | End: 2019-11-13

## 2019-09-25 NOTE — TELEPHONE ENCOUNTER
Last visit 7/10/19  Last refill  - new [email or web address].rxrn  Routing refill request to provider for review/approval because:  Drug not on the [facility] refill protocol   Renee Hill RN  [facility]

## 2019-09-29 ENCOUNTER — HEALTH MAINTENANCE LETTER (OUTPATIENT)
Age: 69
End: 2019-09-29

## 2019-11-08 ENCOUNTER — MYC REFILL (OUTPATIENT)
Dept: FAMILY MEDICINE | Facility: CLINIC | Age: 69
End: 2019-11-08

## 2019-11-08 DIAGNOSIS — K59.00 CONSTIPATION, UNSPECIFIED CONSTIPATION TYPE: ICD-10-CM

## 2019-11-08 DIAGNOSIS — F33.1 MAJOR DEPRESSIVE DISORDER, RECURRENT EPISODE, MODERATE (H): ICD-10-CM

## 2019-11-08 RX ORDER — DULOXETIN HYDROCHLORIDE 60 MG/1
60 CAPSULE, DELAYED RELEASE ORAL DAILY
Qty: 90 CAPSULE | Refills: 0 | Status: SHIPPED | OUTPATIENT
Start: 2019-11-08 | End: 2019-12-10

## 2019-11-08 NOTE — TELEPHONE ENCOUNTER
Last time prescribed: 9/20/19 , 90 tabs/caps x 0 refills  Last office visit: 7/10/19  Next appointment: No Future Appointment Scheduled    Medication is being filled for 1 time refill only due to:  Patient needs to be seen because needs PHQ9.   Renee Hill RN  [facility]

## 2019-11-11 RX ORDER — POLYETHYLENE GLYCOL 3350
POWDER (GRAM) MISCELLANEOUS
Qty: 6 BOTTLE | Refills: 3 | Status: SHIPPED | OUTPATIENT
Start: 2019-11-11

## 2019-11-13 DIAGNOSIS — K59.00 CONSTIPATION, UNSPECIFIED CONSTIPATION TYPE: ICD-10-CM

## 2019-11-13 RX ORDER — LACTULOSE 10 G/15ML
10 SOLUTION ORAL 2 TIMES DAILY
Qty: 2700 ML | Refills: 3 | Status: SHIPPED | OUTPATIENT
Start: 2019-11-13

## 2019-11-13 NOTE — TELEPHONE ENCOUNTER
Last time prescribed: 9/25/19 , 2700 mL x 0 refills  Last office visit: 7/10/19  Next appointment: No future appointments    Routing refill request to provider for review/approval because:  Drug not on the [facility] refill protocol   Renee Hill RN  [facility]

## 2019-12-09 DIAGNOSIS — F33.1 MAJOR DEPRESSIVE DISORDER, RECURRENT EPISODE, MODERATE (H): ICD-10-CM

## 2019-12-09 DIAGNOSIS — I10 ESSENTIAL HYPERTENSION WITH GOAL BLOOD PRESSURE LESS THAN 140/90: ICD-10-CM

## 2019-12-09 NOTE — TELEPHONE ENCOUNTER
cymbalta: Last time prescribed: 11/8/19 , 90 caps x 0 refills   Aldactone: Last time prescribed: 9/11/19, 90 tabs x 1 refills  Last office visit: 7/1/19  Next appointment: No future appointments    Medication is being filled for 1 time refill only due to:  Patient needs labs BMP. Future labs ordered BMP.   Renee Hill RN  [facility]

## 2019-12-10 RX ORDER — SPIRONOLACTONE 50 MG/1
50 TABLET, FILM COATED ORAL DAILY
Qty: 90 TABLET | Refills: 1 | Status: SHIPPED | OUTPATIENT
Start: 2019-12-10 | End: 2020-06-02

## 2019-12-10 RX ORDER — DULOXETIN HYDROCHLORIDE 60 MG/1
60 CAPSULE, DELAYED RELEASE ORAL DAILY
Qty: 90 CAPSULE | Refills: 1 | Status: SHIPPED | OUTPATIENT
Start: 2019-12-10 | End: 2020-06-02

## 2020-03-15 ENCOUNTER — HEALTH MAINTENANCE LETTER (OUTPATIENT)
Age: 70
End: 2020-03-15

## 2020-04-06 DIAGNOSIS — R35.0 BENIGN PROSTATIC HYPERPLASIA WITH URINARY FREQUENCY: ICD-10-CM

## 2020-04-06 DIAGNOSIS — N40.1 BENIGN PROSTATIC HYPERPLASIA WITH URINARY FREQUENCY: ICD-10-CM

## 2020-04-07 RX ORDER — ALFUZOSIN HYDROCHLORIDE 10 MG/1
10 TABLET, EXTENDED RELEASE ORAL DAILY
Qty: 90 TABLET | Refills: 0 | Status: SHIPPED | OUTPATIENT
Start: 2020-04-07 | End: 2020-06-03

## 2020-04-07 NOTE — TELEPHONE ENCOUNTER
7/12/2019  [facility]   Paulette Allen, CNP   Nurse Practitioner    Medium drug alert  Warnings Override History for alfuzosin ER (UROXATRAL) 10 MG 24 hr tablet [[id number]]     Overridden by Paulette Allen CNP on Jul 15, 2019 8:35 AM    Drug-Drug      QUINAZOLINES / BETA-ADRENERGIC BLOCKERS [Level: Moderate]    Other Orders:  metoprolol succinate ER (TOPROL-XL) 50 MG 24 hr tablet

## 2020-04-08 DIAGNOSIS — I10 ESSENTIAL HYPERTENSION WITH GOAL BLOOD PRESSURE LESS THAN 140/90: ICD-10-CM

## 2020-04-08 RX ORDER — LOSARTAN POTASSIUM 50 MG/1
50 TABLET ORAL DAILY
Qty: 90 TABLET | Refills: 0 | Status: SHIPPED | OUTPATIENT
Start: 2020-04-08 | End: 2020-06-03

## 2020-04-08 NOTE — TELEPHONE ENCOUNTER
Last time prescribed: 9/20/19 , 90 tabs x 2 refills  Last office visit: 7/10/19  Next appointment: No future appointments  Medication is being filled for 1 time refill only due to:  Patient needs labs BMP - order already in chart.   Renee Hill RN  [facility]

## 2020-04-29 DIAGNOSIS — E78.5 HYPERLIPIDEMIA LDL GOAL <130: ICD-10-CM

## 2020-04-29 RX ORDER — SIMVASTATIN 20 MG
20 TABLET ORAL AT BEDTIME
Qty: 90 TABLET | Refills: 0 | Status: SHIPPED | OUTPATIENT
Start: 2020-04-29

## 2020-04-29 NOTE — TELEPHONE ENCOUNTER
Last time prescribed: 9/20/19 , 90 tabs x 2 refills  Last office visit: 7/10/19  Next appointment: No future appointments    Prescription approved per [facility] Refill Protocol.    Monica Romero RN  04/29/20  11:20 AM

## 2020-06-01 DIAGNOSIS — I10 ESSENTIAL HYPERTENSION WITH GOAL BLOOD PRESSURE LESS THAN 140/90: ICD-10-CM

## 2020-06-01 DIAGNOSIS — F33.1 MAJOR DEPRESSIVE DISORDER, RECURRENT EPISODE, MODERATE (H): ICD-10-CM

## 2020-06-02 RX ORDER — DULOXETIN HYDROCHLORIDE 60 MG/1
60 CAPSULE, DELAYED RELEASE ORAL DAILY
Qty: 90 CAPSULE | Refills: 0 | Status: SHIPPED | OUTPATIENT
Start: 2020-06-02 | End: 2020-09-10

## 2020-06-02 RX ORDER — SPIRONOLACTONE 50 MG/1
50 TABLET, FILM COATED ORAL DAILY
Qty: 90 TABLET | Refills: 0 | Status: SHIPPED | OUTPATIENT
Start: 2020-06-02 | End: 2020-09-10

## 2020-06-03 DIAGNOSIS — N40.1 BENIGN PROSTATIC HYPERPLASIA WITH URINARY FREQUENCY: ICD-10-CM

## 2020-06-03 DIAGNOSIS — R35.0 BENIGN PROSTATIC HYPERPLASIA WITH URINARY FREQUENCY: ICD-10-CM

## 2020-06-03 DIAGNOSIS — I10 ESSENTIAL HYPERTENSION WITH GOAL BLOOD PRESSURE LESS THAN 140/90: ICD-10-CM

## 2020-06-03 RX ORDER — ALFUZOSIN HYDROCHLORIDE 10 MG/1
10 TABLET, EXTENDED RELEASE ORAL DAILY
Qty: 90 TABLET | Refills: 1 | Status: SHIPPED | OUTPATIENT
Start: 2020-06-03

## 2020-06-03 RX ORDER — LOSARTAN POTASSIUM 50 MG/1
50 TABLET ORAL DAILY
Qty: 90 TABLET | Refills: 0 | Status: SHIPPED | OUTPATIENT
Start: 2020-06-03

## 2020-06-03 NOTE — TELEPHONE ENCOUNTER
Last office visit was on 07/10/2019, no future visits scheduled.    Medication is being filled for 1 time refill only due to:  Patient needs labs BMP. Patient needs to be seen because it has been more than one year since last visit.   Renee Hill RN  [facility]

## 2020-08-21 DIAGNOSIS — I10 ESSENTIAL HYPERTENSION WITH GOAL BLOOD PRESSURE LESS THAN 140/90: ICD-10-CM

## 2020-08-21 RX ORDER — LOSARTAN POTASSIUM 50 MG/1
50 TABLET ORAL DAILY
Qty: 90 TABLET | Refills: 0 | Status: CANCELLED | OUTPATIENT
Start: 2020-08-21

## 2020-08-21 NOTE — TELEPHONE ENCOUNTER
Last time prescribed: 06/03/2020 , 90 tabs x 0 refills  Last office visit: 07/10/2019  Next appointment: No future appointments    Called patient - he is in [place], [place] and won't be coming back to [place] any time soon. He does have a PCP there in [place]. Advised him to contact his PCP there and ask for med refills, as that provider also has recent labs. He will contact his clinic for refills, and will inform his pharmacy of his new PCP.     Monica Romero RN  08/21/20  11:15 AM

## 2020-08-22 DIAGNOSIS — I10 ESSENTIAL HYPERTENSION WITH GOAL BLOOD PRESSURE LESS THAN 140/90: ICD-10-CM

## 2020-08-22 DIAGNOSIS — F33.1 MAJOR DEPRESSIVE DISORDER, RECURRENT EPISODE, MODERATE (H): ICD-10-CM

## 2020-08-24 NOTE — TELEPHONE ENCOUNTER
Spirnolactone Last time prescribed: 6/2/20 , 90 tabs/caps x 0 refills  Duloxetine Last time prescribed: 6/2/20 , 90 tabs/caps x 0 refills  Last office visit: 7/10/19  Next appointment: No Future Appointment Scheduled    Patient has PCP in [place] where he is living now for the majority of the time. He established with a Dr. Mccabe 5/22/20.     Patient was advised previously to contact his PCP in [place] for medication refills, and he was planning to do that. No medication refills at this time - his PCP in [place] will do that.     Monica Romero RN  08/25/20  3:28 PM

## 2020-08-25 RX ORDER — SPIRONOLACTONE 50 MG/1
50 TABLET, FILM COATED ORAL DAILY
Qty: 90 TABLET | Refills: 0 | OUTPATIENT
Start: 2020-08-25

## 2020-08-25 RX ORDER — DULOXETIN HYDROCHLORIDE 60 MG/1
60 CAPSULE, DELAYED RELEASE ORAL DAILY
Qty: 90 CAPSULE | Refills: 0 | OUTPATIENT
Start: 2020-08-25

## 2020-09-09 DIAGNOSIS — I10 ESSENTIAL HYPERTENSION WITH GOAL BLOOD PRESSURE LESS THAN 140/90: ICD-10-CM

## 2020-09-09 DIAGNOSIS — F33.1 MAJOR DEPRESSIVE DISORDER, RECURRENT EPISODE, MODERATE (H): ICD-10-CM

## 2020-09-09 NOTE — TELEPHONE ENCOUNTER
Aldactone Last time prescribed: 6/2/20 , 90 tabs/caps x 0 refills  Cymbalta Last time prescribed: 6/2/20 , 90 tabs/caps x 0 refills  Last office visit: 7/10/19  Next appointment: No Future Appointment Scheduled      Routing refill request to provider for review/approval because:  Pascale given x1 and patient did not follow up, please advise  Labs not current:  bmp  Patient needs to be seen because it has been more than 1 year since last office visit.  Also needs PHQ9 done     COVID-19 , extra 90 day refill authorization per Med Director override.    Note also mail order pharmacy     Shelly Rodriguez RN  September 10, 2020 2:13 PM

## 2020-09-10 RX ORDER — SPIRONOLACTONE 50 MG/1
50 TABLET, FILM COATED ORAL DAILY
Qty: 90 TABLET | Refills: 0 | Status: SHIPPED | OUTPATIENT
Start: 2020-09-10

## 2020-09-10 RX ORDER — DULOXETIN HYDROCHLORIDE 60 MG/1
60 CAPSULE, DELAYED RELEASE ORAL DAILY
Qty: 90 CAPSULE | Refills: 0 | Status: SHIPPED | OUTPATIENT
Start: 2020-09-10

## 2020-10-29 ENCOUNTER — TELEPHONE (OUTPATIENT)
Dept: FAMILY MEDICINE | Facility: CLINIC | Age: 70
End: 2020-10-29

## 2020-10-29 NOTE — TELEPHONE ENCOUNTER
Patient has PCP in [place] where he is living now for the majority of the time. He established with a Dr. Mccabe 5/22/20.    Monica Romero RN  10/29/20  12:02 PM

## 2021-01-10 DIAGNOSIS — K59.00 CONSTIPATION, UNSPECIFIED CONSTIPATION TYPE: ICD-10-CM

## 2021-01-11 RX ORDER — LACTULOSE 10 G/15ML
10 SOLUTION ORAL 2 TIMES DAILY
Qty: 2700 ML | Refills: 3 | Status: CANCELLED | OUTPATIENT
Start: 2021-01-11

## 2021-01-11 NOTE — TELEPHONE ENCOUNTER
Last time prescribed: 11/13/2019 , 10 gm/15 ml x 3 refills  Last office visit: 07/10/2019  Next appointment: No future appointments    SEE PREVIOUS ENCOUNTERS - PATIENT MOVED OUT OF STATE AND HAS A NEW PCP IN [place]. PLEASE FAX BACK TO PHARMACY ANY REFILL REQUESTS.    Monica Romero RN  01/13/21  12:22 PM

## 2021-01-14 ENCOUNTER — HEALTH MAINTENANCE LETTER (OUTPATIENT)
Age: 71
End: 2021-01-14

## 2021-05-09 ENCOUNTER — HEALTH MAINTENANCE LETTER (OUTPATIENT)
Age: 71
End: 2021-05-09

## 2021-06-14 ENCOUNTER — HOSPITAL ENCOUNTER (EMERGENCY)
Facility: CLINIC | Age: 71
Discharge: HOME | End: 2021-06-14
Attending: EMERGENCY MEDICINE | Admitting: EMERGENCY MEDICINE
Payer: MEDICARE

## 2021-06-14 ENCOUNTER — HOSPITAL ENCOUNTER (OUTPATIENT)
Dept: NEUROLOGY | Facility: CLINIC | Age: 71
End: 2021-06-14
Attending: EMERGENCY MEDICINE
Payer: MEDICARE

## 2021-06-14 ENCOUNTER — NURSE TRIAGE (OUTPATIENT)
Dept: FAMILY MEDICINE | Facility: CLINIC | Age: 71
End: 2021-06-14

## 2021-06-14 ENCOUNTER — APPOINTMENT (OUTPATIENT)
Dept: CT IMAGING | Facility: CLINIC | Age: 71
End: 2021-06-14
Attending: EMERGENCY MEDICINE
Payer: MEDICARE

## 2021-06-14 VITALS
TEMPERATURE: 97.5 F | BODY MASS INDEX: 21.92 KG/M2 | RESPIRATION RATE: 18 BRPM | OXYGEN SATURATION: 98 % | WEIGHT: 148 LBS | HEART RATE: 64 BPM | SYSTOLIC BLOOD PRESSURE: 104 MMHG | HEIGHT: 69 IN | DIASTOLIC BLOOD PRESSURE: 66 MMHG

## 2021-06-14 DIAGNOSIS — R56.9 SEIZURE-LIKE ACTIVITY (H): ICD-10-CM

## 2021-06-14 LAB
ANION GAP SERPL CALCULATED.3IONS-SCNC: 2 MMOL/L (ref 3–14)
BUN SERPL-MCNC: 16 MG/DL (ref 7–30)
CALCIUM SERPL-MCNC: 9.8 MG/DL (ref 8.5–10.1)
CHLORIDE SERPL-SCNC: 103 MMOL/L (ref 94–109)
CO2 SERPL-SCNC: 26 MMOL/L (ref 20–32)
CREAT SERPL-MCNC: 1.23 MG/DL (ref 0.66–1.25)
ERYTHROCYTE [DISTWIDTH] IN BLOOD BY AUTOMATED COUNT: 15.3 % (ref 10–15)
GFR SERPL CREATININE-BSD FRML MDRD: 59 ML/MIN/{1.73_M2}
GLUCOSE SERPL-MCNC: 92 MG/DL (ref 70–99)
HCT VFR BLD AUTO: 32.5 % (ref 40–53)
HGB BLD-MCNC: 10.7 G/DL (ref 13.3–17.7)
INTERPRETATION ECG - MUSE: NORMAL
MCH RBC QN AUTO: 29.2 PG (ref 26.5–33)
MCHC RBC AUTO-ENTMCNC: 32.9 G/DL (ref 31.5–36.5)
MCV RBC AUTO: 89 FL (ref 78–100)
NT-PROBNP SERPL-MCNC: 838 PG/ML (ref 0–900)
PLATELET # BLD AUTO: 236 10E9/L (ref 150–450)
POTASSIUM SERPL-SCNC: 4.7 MMOL/L (ref 3.4–5.3)
RBC # BLD AUTO: 3.67 10E12/L (ref 4.4–5.9)
SODIUM SERPL-SCNC: 131 MMOL/L (ref 133–144)
TROPONIN I SERPL-MCNC: <0.015 UG/L (ref 0–0.04)
WBC # BLD AUTO: 8.1 10E9/L (ref 4–11)

## 2021-06-14 PROCEDURE — 93005 ELECTROCARDIOGRAM TRACING: CPT

## 2021-06-14 PROCEDURE — 84484 ASSAY OF TROPONIN QUANT: CPT | Performed by: EMERGENCY MEDICINE

## 2021-06-14 PROCEDURE — 80048 BASIC METABOLIC PNL TOTAL CA: CPT | Performed by: EMERGENCY MEDICINE

## 2021-06-14 PROCEDURE — 70450 CT HEAD/BRAIN W/O DYE: CPT

## 2021-06-14 PROCEDURE — 83880 ASSAY OF NATRIURETIC PEPTIDE: CPT | Performed by: EMERGENCY MEDICINE

## 2021-06-14 PROCEDURE — 95816 EEG AWAKE AND DROWSY: CPT

## 2021-06-14 PROCEDURE — 85027 COMPLETE CBC AUTOMATED: CPT | Performed by: EMERGENCY MEDICINE

## 2021-06-14 PROCEDURE — 99285 EMERGENCY DEPT VISIT HI MDM: CPT | Mod: 25

## 2021-06-14 ASSESSMENT — ENCOUNTER SYMPTOMS
SEIZURES: 1
SHORTNESS OF BREATH: 1

## 2021-06-14 ASSESSMENT — MIFFLIN-ST. JEOR: SCORE: 1421.7

## 2021-06-14 NOTE — ED TRIAGE NOTES
"Patient states he possibly had a seizure yesterday at home around 3 or 4pm with repeated \"spacing out.\" Also reports sharp headache the night before. Wife states eyes \"rolled up\" and patient fell back into chair and was \"shaking\" - this event was 5-10 seconds per wife.   Pt denies seizure history.   "

## 2021-06-14 NOTE — DISCHARGE INSTRUCTIONS
No driving for 3 months and until neurology clears you.      No climbing, being at height, or swimming until neurology says that it is OK.    If you feel your condition has changed or worsened, please call your doctor or return to the emergency department right away.

## 2021-06-14 NOTE — TELEPHONE ENCOUNTER
Patient calls, wants a consult for recent new seizures, new onset weakness in hand/arm, confusion, headache. These episodes have been witnessed by wife, last about a minute. He will remain weak, confused, and difficulty with speech after the seizure ends. He has not seen a provider for this. He lives in [place], [place] PCP and many specialists. He is in [place] temporarily. Advised patient that he needs to seek care at ED and if not able to get there or if symptoms return, to call 911.     Reason for Disposition    [1] Weakness (i.e., paralysis, loss of muscle strength) of the face, arm / hand, or leg / foot on one side of the body AND [2] sudden onset AND [3] brief (now gone)    Protocols used: NEUROLOGIC DEFICIT-A-    Patient verbalized understanding and agrees with this plan. He states he will go to [place] ED.     Monica Romero RN  06/14/21  11:17 AM

## 2021-06-14 NOTE — ED PROVIDER NOTES
"  History   Chief Complaint:  Seizures       HPI   Montana Salamanca is a 71 year old male with history of cardiomyopathy and anemia who presents with seizure-like episodes. The patient states that yesterday he was sitting with his wife when he suddenly \"blanked out.\" The patient's wife says his eyes rolled back, he began shaking, and he slid out of his chair. He was immediately alert afterwards and feels normal today. This type of episode has happened multiple times before, sometimes when standing. The patient's wife says that about a month ago they were on the phone when the patient went silent for about 10 seconds, and he had dropped his phone and did not recall the event. The patient often has shortness of breath with exertion and upon standing. The patient denies chest pain. Of note, the patient lives in [place] but receives his medical care in [place]. He has a cardiology appointment scheduled for July.    Review of Systems   Respiratory: Positive for shortness of breath.    Cardiovascular: Negative for chest pain.   Neurological: Positive for seizures.   All other systems reviewed and are negative.      Allergies:  Bee venom  Kiwi  Wasps    Medications:  Acyclovir  Alfuzosin  Apremilast  Bisacodyl  Chlorpheniramine-pseudoephedrine  Duloxetine  Epinephrine  Lactulose  Losartan  Metoprolol succinate ER  Pantoprazole  Polyethylene glycol  Simethicone  Simvastatin  Spironolactone    Past Medical History:    Psoriasis  Bleeding external hemorrhoids  Excessive drinking of alcohol  Chronic pain of left knee  Congestive heart failure  PTSD  Hypotestosteronism  Hyperlipidemia  Osteopenia  Anxiety  Cervical pain (neck)  Renal cyst  Tobacco use  Chronic kidney disease  GERD  Hypertension  Cardiomyopathy    Past Surgical History:    Appendectomy    Family History:    Heart failure  Leukemia    Social History:  Accompanied to the ED by his significant other.  Lives in [place].    Physical Exam     Patient Vitals for the past " "24 hrs:   BP Temp Temp src Pulse Resp SpO2 Height Weight   06/14/21 1204 122/63 97.5  F (36.4  C) Oral 60 18 100 % 1.753 m (5' 9\") 67.1 kg (148 lb)       Physical Exam  General/Appearance: appears stated age, well-groomed, appears comfortable  Eyes: EOMI, no scleral injection, no icterus  ENT: MMM  Neck: supple, nl ROM, no stiffness  Cardiovascular: RRR, nl S1S2, no m/r/g, 2+ pulses in all 4 extremities, cap refill <2sec  Respiratory: CTAB, good air movement throughout, no wheezes/rhonchi/rales, no increased WOB, no retractions  Back: no lesions  GI: abd soft, non-distended, nttp,  no HSM, no rebound, no guarding, nl BS  MSK: MOSER, good tone, no bony abnormality  Skin: warm and well-perfused, no rash, no edema, no ecchymosis, nl turgor  Neuro: GCS 15, alert and oriented, no gross focal neuro deficits  Psych: interacts appropriately  Heme: no petechia, no purpura, no active bleeding        Emergency Department Course   ECG  ECG taken at 1356, ECG read at 1411  Sinus bradycardia with 1st degree AV block  Left axis deviation  Nonspecific intraventricular conduction delay  Abnormal ECG   No previous ECG for comparison  Rate 54 bpm. VA interval 218 ms. QRS duration 122 ms. QT/QTc 450/426 ms. P-R-T axes 20 -35 69.       Imaging:  CT Head w/o Contrast  Mild cortical atrophy. No evidence for intracranial  hemorrhage or any acute process.    KWASI SALINAS MD  Reading per radiology      Laboratory:  CBC: WBC 8.1, HGB 10.7 (L),    BMP:  (L), Anion gap 2 (L), GFR 59 (L) o/w WNL (Creatinine 1.23)     Troponin (Collected 1255): <0.015  BNP: 838        Emergency Department Course:    Reviewed:  I reviewed nursing notes, vitals, past medical history and care everywhere    Assessments:  1305 I obtained history and examined the patient as noted above.   1446 I rechecked the patient and explained findings.   1454 I rechecked the patient and explained the plan from neurology.    Consults:   1448 I consulted Dr. Fernando of " "neurology.    Disposition:  Care of the patient was transferred to my colleague Dr. Mcguire pending EEG.     Impression & Plan     Medical Decision Making:  This patient is a pleasant 71-year-old male with history of cardiomyopathy, scheduled to follow-up with cardiology within the next several weeks to month, who presents today with seizure-like activity.  Describes episodes over the past where he \"blanks out\" that sound like they could be absence seizure's.  There is no definitive history that is consistent with syncopal episodes or arrhythmias.  Then yesterday he had an episode where he had total body shaking activity when his eyes rolled back.  He sitting in the chair when this happened and does not sound like syncopal episode, there is no chest pain or shortness of breath or palpitations, but also did not definitively sound like seizure episode as he regained consciousness and alertness right after it ended.  Head CT was unremarkable.  His electrolytes are a little off with a sodium of 131 but I do not think this is low enough to lead to seizures.  I spoke with the on-call neurologist who wants to get a stat EEG.  If this is normal she will feel comfortable with the patient getting discharged home.  She plans on reading the EEG tonight and getting back to us.  At this point in time I am signing him out to my colleague, Dr. Mcguire, as we are still awaiting the EEG tech to arrive.    Diagnosis:    ICD-10-CM    1. Seizure-like activity (H)  R56.9        Scribe Disclosure:  I, Wagner Vivar, am serving as a scribe at 1:05 PM on 6/14/2021 to document services personally performed by Radha Arrieta MD based on my observations and the provider's statements to me.    Scribe Disclosure:  I, Lindsey Méndez, am serving as a scribe at 2:51 PM on 6/14/2021 to document services personally performed by Radha Arrieta MD based on my observations and the provider's statements to me.       "   Radha Arrieta MD  06/14/21 2000

## 2021-06-15 NOTE — ED PROVIDER NOTES
I received patient in signout from Dr. Arrieta.  Please refer to their complete H&P for further information. At time of signout, EEG pending.     I spoke with Dr. Fernando of Neurology at 1829, who reviewed the patient's EEG results. No signs of seizure like activity n EEG and he will follow up in clinic. Instructed no driving for 3 months and no swimming/climbing until cleared by neurology.     I reevaluated the patient and discussed discharged.   The patient is comfortable and in agreement with the plan.  With reasonable clinical confidence, I believe the patient is safe for discharge.     Scribe Disclosure:  I, Orla Severson, am serving as a scribe at 7:07 PM on 6/14/2021 to document services personally performed by Angelica Mcguire MD based on my observations and the provider's statements to me.            Angelica Mcguire MD  06/15/21 0024

## 2021-06-24 ENCOUNTER — TELEPHONE (OUTPATIENT)
Dept: CARDIOLOGY | Facility: CLINIC | Age: 71
End: 2021-06-24

## 2021-06-24 NOTE — TELEPHONE ENCOUNTER
"Patient is calling requesting an appt on an urgent basis. States today he has had an  irregular heart beat that lasts approximately 10 seconds, reports come on with and without exertion. He states he feels faint each time, like he's going to faint but does not. States with rest he then feels better. He states he has had 5 of these episodes today. Last episode happening a few minutes prior to call. Patient is advised to go to ED to be evaluated. He intially refuses to do so, stating \"I am not going to go sit in a small cold room for 9 hours waiting to be evaluated, when all I need is a monitor\".  Explained to patient he will evaluated thoroughly for his symptoms when he is seen in ED. Patient states, alright, and hung up.       "

## 2021-10-24 ENCOUNTER — HEALTH MAINTENANCE LETTER (OUTPATIENT)
Age: 71
End: 2021-10-24

## 2022-06-05 ENCOUNTER — HEALTH MAINTENANCE LETTER (OUTPATIENT)
Age: 72
End: 2022-06-05

## 2022-10-15 ENCOUNTER — HEALTH MAINTENANCE LETTER (OUTPATIENT)
Age: 72
End: 2022-10-15

## 2023-01-01 NOTE — TELEPHONE ENCOUNTER
Last office visit was on 07/10/2019, no future visits scheduled.    Medication is being filled for 1 time refill only due to:  Patient needs labs BMP. Patient needs to be seen because needs PHQ9.   Renee Hill RN  [facility] [place]   Patient/Caregiver provided printed discharge information.

## 2023-06-11 ENCOUNTER — HEALTH MAINTENANCE LETTER (OUTPATIENT)
Age: 73
End: 2023-06-11

## 2024-08-12 NOTE — LETTER
7/12/2019       RE: Montana Salamanca  [address]  [facility] [address]     Dear Colleague,    Thank you for referring your patient, Montana Salamanca, to the [facility] AND [facility] PROSTATE AND UROLOGIC CANCERS at [facility]. Please see a copy of my visit note below.            Chief Complaint:   Urinary urgency         History of Present Illness:    Montana Salamanca is a very pleasant 69 year old male who presents for evaluation of the above. Per chart review, he was recently seen by his PCP, Dr. Pena, on 7/10/19 for this issue. At their visit, he noted an increase in urinary urgency, and found that it had become difficult initiating his stream, along with a decrease in the strength of his stream. He also reported occasional frequency, which is his most bothersome symptom. PSA was drawn that day, and was 0.14. He reportedly would like to avoid tamsulosin. He is planning to move back to [place] in August.     Today, he states that his primary issue is that he is needing to return to the bathroom immediately after he is done voiding to go again. He states that he previously saw a urologist in [place], [place], who prescribed him tamsulosin. He trialed this, but didn't seem to think he tolerated it well. However, he is open to trialing this again, this time taking it at night if he is to develop side effects. He otherwise denies dysuria, pyuria, or hematuria. Of note, he is planning to move back to [place] in the next few weeks.          Past Medical History:   Hypotestosteronism  Vitamin D deficiency   Hyperlipidemia  Anxiety  PTSD  CHF  Hemorrhoids  ETOH  Chronic pain         Past Surgical History:   Reviewed--none pertinent          Medications     Current Outpatient Medications   Medication     acyclovir (ZOVIRAX) 800 MG tablet     aspirin 81 MG tablet     bisacodyl (DULCOLAX) 5 MG EC tablet     chlorpheniramine-pseudoePHEDrine (DECONAMINE SR) 8-120 MG per capsule      clobetasol (TEMOVATE) 0.05 % cream     clobetasol propionate 0.05 % SHAM     clotrimazole-betamethasone (LOTRISONE) lotion     desonide (DESOWEN) 0.05 % cream     diphenhydrAMINE (BENADRYL) 25 MG tablet     DULoxetine (CYMBALTA) 60 MG capsule     EPINEPHrine (EPIPEN 2-HUGO) 0.3 MG/0.3ML injection     ketoconazole (NIZORAL) 2 % cream     losartan (COZAAR) 50 MG tablet     magnesium 250 MG tablet     metoprolol succinate ER (TOPROL-XL) 50 MG 24 hr tablet     pantoprazole (PROTONIX) 40 MG EC tablet     polyethylene glycol 3350 POWD     SIMETHICONE     simvastatin (ZOCOR) 20 MG tablet     spironolactone (ALDACTONE) 50 MG tablet     apremilast (OTEZLA) 30 MG tablet     B Complex Vitamins (VITAMIN B COMPLEX PO)     carvedilol (COREG) 6.25 MG tablet     Cholecalciferol (VITAMIN D) 1000 UNITS capsule     diazepam (VALIUM) 2 MG tablet     No current facility-administered medications for this visit.             Family History:   No family history on file.         Social History:     Social History     Socioeconomic History     Marital status: Single     Spouse name: Not on file     Number of children: Not on file     Years of education: Not on file     Highest education level: Not on file   Occupational History     Not on file   Social Needs     Financial resource strain: Not on file     Food insecurity:     Worry: Not on file     Inability: Not on file     Transportation needs:     Medical: Not on file     Non-medical: Not on file   Tobacco Use     Smoking status: Current Every Day Smoker     Types: Cigars, cigarillos or filtered cigars     Smokeless tobacco: Never Used   Substance and Sexual Activity     Alcohol use: No     Drug use: Not on file     Sexual activity: Not on file   Lifestyle     Physical activity:     Days per week: Not on file     Minutes per session: Not on file     Stress: Not on file   Relationships     Social connections:     Talks on phone: Not on file     Gets together: Not on file     Attends Mandaeism  "service: Not on file     Active member of club or organization: Not on file     Attends meetings of clubs or organizations: Not on file     Relationship status: Not on file     Intimate partner violence:     Fear of current or ex partner: Not on file     Emotionally abused: Not on file     Physically abused: Not on file     Forced sexual activity: Not on file   Other Topics Concern     Parent/sibling w/ CABG, MI or angioplasty before 65F 55M? Not Asked   Social History Narrative     Not on file            Allergies:   Bee venom; Kiwi; and Wasps [hornets]         Review of Systems:  From intake questionnaire   Negative 14 system review except as noted on HPI, nurse's note.         Physical Exam:   Patient is a 69 year old  male   Vitals: Blood pressure 133/80, pulse 83, height 1.753 m (5' 9.02\"), weight 69.7 kg (153 lb 9.6 oz).  General Appearance Adult: Alert, no acute distress, oriented  Lungs: no respiratory distress, or pursed lip breathing  Heart: No obvious jugular venous distension present  Abdomen: soft, nontender, no organomegaly or masses, Body mass index is 22.67 kg/m .  Musculoskeltal: extremities normal, no peripheral edema  Skin: no suspicious lesions or rashes  Neuro: Alert, oriented, speech and mentation normal  : deferred     Uroflow and Post-Void Residual by Bladder Scan   Urinary Flow Rate  Residual Volume by Ultrasound: 34 mL      Labs and Pathology:    I personally reviewed all applicable laboratory data and went over findings with patient  Significant for:    CBC RESULTS:  Recent Labs   Lab Test 08/28/15  1111 08/04/15  1539 05/14/11  0640 05/13/11  2230   WBC 6.7 5.1 7.8 11.1*   HGB 11.8* 13.3 12.2* 13.1*    164 177 218        BMP RESULTS:  Recent Labs   Lab Test 08/31/16  1030 08/04/15  1339 08/25/14  1115 08/25/14  1018 07/03/13  0950  05/14/11  0640 05/13/11  2230   .0* 128.0*  --  126.0* 135.0   < > 133 132*   POTASSIUM 4.0 5.1  --  3.5 4.5   < > 4.2 4.6   CHLORIDE 94.0 89.0* " 80*  --  94.0   < > 95 91*   CO2 29.0 26.0  --  30.0 30.0   < > 28 25   ANIONGAP  --   --   --   --   --   --  10 16   GLC 99.0 115.0*  --  105.0 101.0   < > 98 96   BUN 7.0 20.0  --  16.0 17.0   < > 26 33*   CR 1.2 1.1  --  1.2 1.0   < > 1.32* 1.51*   GFRESTIMATED  --   --   --   --   --   --  55* 47*   GFRESTBLACK  --   --   --   --   --   --  67 57*   MICHAEL 9.8 9.1  --  9.6 9.8   < > 8.9 9.6    < > = values in this interval not displayed.       UA RESULTS:   Recent Labs   Lab Test 05/23/11  1122   SG 1.010   URINEPH 7.0   NITRITE neg       PSA RESULTS  PSA   Date Value Ref Range Status   07/10/2019 0.14 0 - 4 ug/L Final     Comment:     Assay Method:  Chemiluminescence using Siemens Vista analyzer   05/07/2018 0.07 0 - 4 ug/L Final     Comment:     Assay Method:  Chemiluminescence using Siemens Vista analyzer   08/25/2014 0.78 0 - 4 ug/L Final         Imaging:    I personally reviewed all applicable imaging            Assessment and Plan:     Assessment: 69 year old male with urinary urgency and double-voiding, who previously trialed tamsulosin, and does not seem to think he tolerated it at that time. However, he does not recall the circumstances of this. He is open to trialing this again, or an alternative which may have fewer side effects. We discussed that alfuzosin may be an alternative option, with a better side effect profile, but he would prefer to check insurance coverage of these options first.     Plan:  -We can consider trialing tamsulosin or alfuzosin. Patient to look into coverage for these medications.   -Instructed to contact our clinic regarding which medication he would like to try, whether by phone or via [employer].   -Follow up as needed in the future, as the patient is planning to more back to [place] in a few weeks.      Paulette Allen, CNP  Department of Urology        upper partial/dentures